# Patient Record
Sex: FEMALE | Race: BLACK OR AFRICAN AMERICAN | NOT HISPANIC OR LATINO | Employment: PART TIME | URBAN - METROPOLITAN AREA
[De-identification: names, ages, dates, MRNs, and addresses within clinical notes are randomized per-mention and may not be internally consistent; named-entity substitution may affect disease eponyms.]

---

## 2017-03-09 ENCOUNTER — OFFICE VISIT (OUTPATIENT)
Dept: FAMILY MEDICINE | Facility: CLINIC | Age: 47
End: 2017-03-09

## 2017-03-09 VITALS
BODY MASS INDEX: 28.96 KG/M2 | OXYGEN SATURATION: 97 % | HEART RATE: 80 BPM | DIASTOLIC BLOOD PRESSURE: 69 MMHG | RESPIRATION RATE: 18 BRPM | SYSTOLIC BLOOD PRESSURE: 107 MMHG | WEIGHT: 169 LBS | TEMPERATURE: 98.5 F

## 2017-03-09 DIAGNOSIS — R07.0 THROAT PAIN: Primary | ICD-10-CM

## 2017-03-09 DIAGNOSIS — J06.9 VIRAL URI: ICD-10-CM

## 2017-03-09 LAB — S PYO AG THROAT QL IA.RAPID: NEGATIVE

## 2017-03-09 RX ORDER — SENNOSIDES 8.6 MG
650 CAPSULE ORAL EVERY 8 HOURS PRN
Qty: 60 TABLET | Refills: 3 | Status: SHIPPED | OUTPATIENT
Start: 2017-03-09 | End: 2020-01-28

## 2017-03-09 NOTE — PROGRESS NOTES
HPI       Satish Tang is a 47 year old  female  who presents with sore throat. Has had a son with strep and is concerned she is mallika it from him,. She has a mild cough. She describes her throat is throbbing.       Patient Active Problem List    Diagnosis Date Noted     Health Care Home 10/30/2012     Priority: Medium     Tier 0  DX V65.8 REPLACED WITH 77191 HEALTH CARE HOME (04/08/2013)       Constipation 10/30/2012     Priority: Medium     Heart burn 10/30/2012     Priority: Medium       Current Outpatient Prescriptions   Medication Sig Dispense Refill     Prenatal Vit-Fe Fumarate-FA (PRENATAL MULTIVITAMIN  PLUS IRON) 27-0.8 MG TABS per tablet Take 1 tablet by mouth daily 100 tablet 3     Cholecalciferol (VITAMIN D) 2000 UNITS tablet Take 2,000 Units by mouth daily 100 tablet 3     miconazole 200 & 2 MG-% (9GM) KIT Place 1 each vaginally At Bedtime 3 each 1     camphor-menthol-methyl sal 4-10-30 % CREA Apply small amount to affected area of back 1 Tube 0     triamcinolone (KENALOG) 0.1 % cream Apply sparingly to affected area three times daily as needed 80 g 0     loratadine (CLARITIN) 10 MG tablet Take 1 tablet (10 mg) by mouth daily 90 tablet 3     Multivitamins CF Formula (MVW COMPLETE FORMULATION) CAPS softgel capsule Take 1 capsule by mouth daily 60 capsule 2     ranitidine (ZANTAC) 150 MG tablet Take 1 tablet (150 mg) by mouth 2 times daily 60 tablet 1     acetaminophen (TYLENOL) 325 MG tablet Take 2 tablets (650 mg) by mouth 3 times daily as needed for pain 100 tablet 0     senna-docusate (NINA-COLACE) 8.6-50 MG per tablet Take 1-2 tablets by mouth 2 times daily as needed for constipation 20 tablet 1     ibuprofen (IBU) 600 MG tablet Take 1 tablet (600 mg) by mouth every 6 hours as needed for pain 60 tablet 0     diclofenac (VOLTAREN) 1 % GEL Apply 4 grams to knees two to three times daily using enclosed dosing card. 100 g 1     ferrous sulfate 325 (65 FE) MG tablet Take 1 tablet (325 mg)  by mouth 2 times daily 30 tablet 2        No Known Allergies          +++++++      Problem, Medication and Allergy Lists were reviewed and updated if needed..    Patient is an established patient of this clinic..         Review of Systems:   General: No distress  HEENT: + sore throat  Pulm: + mild cough  CVS: No chest pain, no palpitations  GI: No abdominal pain, nausea, vomiting or diarrhea   : No dysuria  MSK: No back pain  Skin: No new rashes  Neuro: No headache, no dizziness,               Physical Exam:     Vitals:    03/09/17 1123   BP: 107/69   BP Location: Left arm   Patient Position: Chair   Cuff Size: Adult Regular   Pulse: 80   Resp: 18   Temp: 98.5  F (36.9  C)   TempSrc: Oral   SpO2: 97%   Weight: 169 lb (76.7 kg)     Body mass index is 28.96 kg/(m^2).    Constitutional: Awake, alert, cooperative, no apparent distress, and appears stated age  HEENT: MMM, no conjunctival pallor, clear oropharynx. No uvula deviation.   Pulm: CTAB, No rales, No wheeze, no increased effort of breathing.  CVS: RRR, No murmurs   GI: S NT ND BS +ve  Hem/Onc: No cervical LN kati  Skin : no  rash  Neuro:  No focal neurological deficit  Psych : Good eye contact, well groomed, very interactive and collaborative. Good insight. Thought process is linear and coherent. Associations are tight. Mood is good. Affect euthymic.         No results found for this or any previous visit (from the past 24 hour(s)).    Assessment and Plan      1. Throat pain- Negative rapid strep. likely viral URI. Reassured patient, tylenol for comfort.     - Strep Screen Rapid (Group) ([facility])  - acetaminophen (TYLENOL 8 HOUR) 650 MG CR tablet; Take 1 tablet (650 mg) by mouth every 8 hours as needed for mild pain or fever  Dispense: 60 tablet; Refill: 3  - Strep Culture ([facility])      There are no discontinued medications.    Options for treatment and follow-up care were reviewed with the patient. Satish Tang  engaged in the decision making process and  verbalized understanding of the options discussed and agreed with the final plan.    Katherine Choudhury MD G3  [facility]  Family Medicine Resident  Pager [phone]

## 2017-03-09 NOTE — MR AVS SNAPSHOT
After Visit Summary   3/9/2017    Satish Tang    MRN: [id number]           Patient Information     Date Of Birth          1970        Visit Information        Provider Department      3/9/2017 11:20 AM Katherine Choudhury MD [facility]        Today's Diagnoses     Throat pain    -  1    Viral URI           Follow-ups after your visit        Who to contact     Please call your clinic at [phone] to:    Ask questions about your health    Make or cancel appointments    Discuss your medicines    Learn about your test results    Speak to your doctor   If you have compliments or concerns about an experience at your clinic, or if you wish to file a complaint, please contact [facility] Patient Relations at [phone] or email us at [email or web address]         Additional Information About Your Visit        MyChart Information     EMBI is an electronic gateway that provides easy, online access to your medical records. With EMBI, you can request a clinic appointment, read your test results, renew a prescription or communicate with your care team.     To sign up for WoowUpt visit the website at [email or web address]   You will be asked to enter the access code listed below, as well as some personal information. Please follow the directions to create your username and password.     Your access code is: [email or web address]  Expires: 2017 12:00 PM     Your access code will  in 90 days. If you need help or a new code, please contact your [facility] or call [phone] for assistance.        Care EveryWhere ID     This is your Care EveryWhere ID. This could be used by other organizations to access your [place] medical records  [id number]        Your Vitals Were     Pulse Temperature Respirations Pulse Oximetry Breastfeeding? BMI (Body Mass Index)    80 98.5  F (36.9  C) (Oral) 18 97% No 28.96 kg/m2        Blood Pressure from Last 3 Encounters:   03/15/17 107/70   03/09/17 107/69   12/21/16 110/71    Weight from Last 3 Encounters:   03/15/17 167 lb 12.8 oz (76.1 kg)   03/09/17 169 lb (76.7 kg)   12/21/16 171 lb (77.6 kg)              We Performed the Following     Strep Culture ([facility])     Strep Screen Rapid (Group) ([facility])          Today's Medication Changes          These changes are accurate as of: 3/9/17 11:59 PM.  If you have any questions, ask your nurse or doctor.               These medicines have changed or have updated prescriptions.        Dose/Directions    * acetaminophen 325 MG tablet   Commonly known as:  TYLENOL   This may have changed:  Another medication with the same name was added. Make sure you understand how and when to take each.   Used for:  Headache(784.0)   Changed by:  Moses Cash MD        Dose:  650 mg   Take 2 tablets (650 mg) by mouth 3 times daily as needed for pain   Quantity:  100 tablet   Refills:  0       * acetaminophen 650 MG CR tablet   Commonly known as:  TYLENOL 8 HOUR   This may have changed:  You were already taking a medication with the same name, and this prescription was added. Make sure you understand how and when to take each.   Used for:  Throat pain   Changed by:  Katherine Choudhury MD        Dose:  650 mg   Take 1 tablet (650 mg) by mouth every 8 hours as needed for mild pain or fever   Quantity:  60 tablet   Refills:  3       * Notice:  This list has 2 medication(s) that are the same as other medications prescribed for you. Read the directions carefully, and ask your doctor or other care provider to review them with you.         Where to get your medicines      These medications were sent to [facility] [place], [place] - [address] [facility] [address]     Phone:  [phone]     acetaminophen 650 MG CR tablet                Primary Care Provider Office Phone # Fax #    Sallie Gibson -[phone]        [facility] [address] [address]        Thank you!     Thank you for choosing [facility]  for your care. Our goal is always to provide you with excellent care. Hearing back from our patients is one way we can continue to improve our services. Please take a few minutes to complete the written survey that you may receive in the mail after your visit with us. Thank you!             Your Updated Medication List - Protect others around you: Learn how to safely use, store and throw away your medicines at www.disposemymeds.org.          This list is accurate as of: 3/9/17 11:59 PM.  Always use your most recent med list.                   Brand Name Dispense Instructions for use    * acetaminophen 325 MG tablet    TYLENOL    100 tablet    Take 2 tablets (650 mg) by mouth 3 times daily as needed for pain       * acetaminophen 650 MG CR tablet    TYLENOL 8 HOUR    60 tablet    Take 1 tablet (650 mg) by mouth every 8 hours as needed for mild pain or fever       camphor-menthol-methyl sal 4-10-30 % Crea     1 Tube    Apply small amount to affected area of back       diclofenac 1 % Gel topical gel    VOLTAREN    100 g    Apply 4 grams to knees two to three times daily using enclosed dosing card.       ferrous sulfate 325 (65 FE) MG tablet    IRON    30 tablet    Take 1 tablet (325 mg) by mouth 2 times daily       ibuprofen 600 MG tablet    IBU    60 tablet    Take 1 tablet (600 mg) by mouth every 6 hours as needed for pain       loratadine 10 MG tablet    CLARITIN    90 tablet    Take 1 tablet (10 mg) by mouth daily       miconazole 200 & 2 MG-% (9GM) Kit     3 each    Place 1 each vaginally At Bedtime       multivitamins CF formula Caps capsule     60 capsule    Take 1 capsule by mouth daily       prenatal multivitamin  plus iron 27-0.8 MG Tabs per tablet     100 tablet    Take 1 tablet by mouth daily       ranitidine 150 MG tablet    ZANTAC    60 tablet    Take 1 tablet  (150 mg) by mouth 2 times daily       triamcinolone 0.1 % cream    KENALOG    80 g    Apply sparingly to affected area three times daily as needed       vitamin D 2000 UNITS tablet     100 tablet    Take 2,000 Units by mouth daily       * Notice:  This list has 2 medication(s) that are the same as other medications prescribed for you. Read the directions carefully, and ask your doctor or other care provider to review them with you.

## 2017-03-11 LAB
BACTERIA SPEC CULT: NORMAL
MICRO REPORT STATUS: NORMAL
SPECIMEN SOURCE: NORMAL

## 2017-03-15 ENCOUNTER — OFFICE VISIT (OUTPATIENT)
Dept: FAMILY MEDICINE | Facility: CLINIC | Age: 47
End: 2017-03-15

## 2017-03-15 VITALS
RESPIRATION RATE: 16 BRPM | BODY MASS INDEX: 28.75 KG/M2 | SYSTOLIC BLOOD PRESSURE: 107 MMHG | OXYGEN SATURATION: 99 % | WEIGHT: 167.8 LBS | HEART RATE: 59 BPM | TEMPERATURE: 97.7 F | DIASTOLIC BLOOD PRESSURE: 70 MMHG

## 2017-03-15 DIAGNOSIS — N92.6 MISSED PERIODS: Primary | ICD-10-CM

## 2017-03-15 DIAGNOSIS — K59.09 CHRONIC CONSTIPATION: ICD-10-CM

## 2017-03-15 LAB
HCG UR QL: NEGATIVE
TSH SERPL DL<=0.005 MIU/L-ACNC: 0.93 MU/L (ref 0.4–4)

## 2017-03-15 RX ORDER — AMOXICILLIN 250 MG
1-2 CAPSULE ORAL 2 TIMES DAILY PRN
Qty: 90 TABLET | Refills: 3 | Status: SHIPPED | OUTPATIENT
Start: 2017-03-15 | End: 2020-02-19

## 2017-03-15 ASSESSMENT — ENCOUNTER SYMPTOMS
NEUROLOGICAL NEGATIVE: 1
CARDIOVASCULAR NEGATIVE: 1
CONSTITUTIONAL NEGATIVE: 1
PSYCHIATRIC NEGATIVE: 1
RESPIRATORY NEGATIVE: 1
ENDOCRINE NEGATIVE: 1
HEMATOLOGIC/LYMPHATIC NEGATIVE: 1

## 2017-03-15 NOTE — LETTER
March 17, 2017      Satish Dale Tang  [address] [facility] [address]        Dear Satish,    Thank you for getting your care at [facility]. Please see below for your test results. All are normal. I have sent a prescription for birth control pills that should help make your period more regular. Please come see me for a recheck after taking the pills for one month.     Resulted Orders   HCG Qualitative Urine (UPT) ([facility])   Result Value Ref Range    HCG Qual Urine NEGATIVE Negative   TSH with free T4 reflex   Result Value Ref Range    TSH 0.93 0.40 - 4.00 mU/L       If you have any concerns about these results please call and leave a message for me or send a Databanqt message to the clinic.    Sincerely,    Sallie Gibson MD

## 2017-03-15 NOTE — PROGRESS NOTES
HPI:       Satish Tang is a 47 year old who presents for the following  Patient presents with:  Constipation    A Albanian  was used for  this visit.      Constipation     Onset: always has had hard stools    Description:   Frequency of bowel movements: 4-5 days.  Stool consistency: hard, small caliber    Progression of Symptoms:  worsening    Accompanying Signs & Symptoms:  Abdominal pain (cramping?): YES  Blood in stool: no   Rectal pain: no   Nausea/vomiting: no   Weight loss or gain: no    History:   History of abdominal surgery: no     Precipitating factors:   Recent use of narcotics, anticholinergics, calcium channel blockers, antacids, or iron supplements: no   Chronic Laxative Use: has used stool softener and laxative intermittently in past. Not using for some time.         Therapies Tried and outcome: change in diet, increase in fluids and stool softeners    Problem, Medication and Allergy Lists were   reviewed and are current.     Patient Active Problem List    Diagnosis Date Noted     Missed periods 03/15/2017     Priority: Medium     Health Care Home 10/30/2012     Priority: Medium     Tier 0  DX V65.8 REPLACED WITH 20650 HEALTH CARE HOME (04/08/2013)       Constipation 10/30/2012     Priority: Medium     Heart burn 10/30/2012     Priority: Medium     Patient is an established patient of this clinic.         Review of Systems:   Review of Systems   Constitutional: Negative.    Respiratory: Negative.    Cardiovascular: Negative.    Endocrine: Negative.    Neurological: Negative.    Hematological: Negative.    Psychiatric/Behavioral: Negative.              Physical Exam:   Patient Vitals for the past 24 hrs:   BP Temp Temp src Pulse Resp SpO2 Weight   03/15/17 1110 107/70 97.7  F (36.5  C) Oral 59 16 99 % 167 lb 12.8 oz (76.1 kg)     Body mass index is 28.75 kg/(m^2).  Vitals were reviewed      Physical Exam   Constitutional: She appears well-developed and well-nourished. No distress.    Neck: Normal range of motion. No thyromegaly present.   Cardiovascular: Regular rhythm.  Exam reveals no gallop and no friction rub.    No murmur heard.  Bradycardic   Abdominal: Soft. Bowel sounds are normal. She exhibits no distension and no mass. There is no tenderness. There is no rebound and no guarding.   Psychiatric: She has a normal mood and affect.         Results:     Pending  Assessment and Plan     1. Chronic constipation, recurrent  - senna-docusate (NINA-COLACE) 8.6-50 MG per tablet; Take 1-2 tablets by mouth 2 times daily as needed for constipation  Dispense: 90 tablet; Refill: 3  - TSH with free T4 reflex    2. Missed periods  - HCG Qualitative Urine (UPT) ([facility])  - TSH with free T4 reflex    3. Bradycardia, asymptomatic  -  TSH    Will check UPT and TSH and call with results. If not pregnant, would like to start OCP to try to regulate periods. Nonsmoker, normotensive, no h/o clots.     There are no discontinued medications.  Options for treatment and follow-up care were reviewed with the patient. Satish Tang  engaged in the decision making process and verbalized understanding of the options discussed and agreed with the final plan.    Sallie Gibson MD

## 2017-03-15 NOTE — MR AVS SNAPSHOT
After Visit Summary   3/15/2017    Satish Tang    MRN: [id number]           Patient Information     Date Of Birth          1970        Visit Information        Provider Department      3/15/2017 11:00 AM Sallie Gibson MD [facility]        Today's Diagnoses     Missed periods    -  1    Chronic constipation           Follow-ups after your visit        Follow-up notes from your care team     Return if symptoms worsen or fail to improve.      Who to contact     Please call your clinic at [phone] to:    Ask questions about your health    Make or cancel appointments    Discuss your medicines    Learn about your test results    Speak to your doctor   If you have compliments or concerns about an experience at your clinic, or if you wish to file a complaint, please contact [facility] Patient Relations at [phone] or email us at [email or web address]         Additional Information About Your Visit        MyChart Information     Voltairet is an electronic gateway that provides easy, online access to your medical records. With [employer], you can request a clinic appointment, read your test results, renew a prescription or communicate with your care team.     To sign up for Voltairet visit the website at [email or web address]   You will be asked to enter the access code listed below, as well as some personal information. Please follow the directions to create your username and password.     Your access code is: [email or web address]  Expires: 2017 12:00 PM     Your access code will  in 90 days. If you need help or a new code, please contact your [facility] or call [phone] for assistance.        Care EveryWhere ID     This is your Care EveryWhere ID. This could be used by other organizations to access your [place] medical records  [id number]        Your Vitals Were     Pulse Temperature Respirations  Pulse Oximetry BMI (Body Mass Index)       59 97.7  F (36.5  C) (Oral) 16 99% 28.75 kg/m2        Blood Pressure from Last 3 Encounters:   03/15/17 107/70   03/09/17 107/69   12/21/16 110/71    Weight from Last 3 Encounters:   03/15/17 167 lb 12.8 oz (76.1 kg)   03/09/17 169 lb (76.7 kg)   12/21/16 171 lb (77.6 kg)              We Performed the Following     HCG Qualitative Urine (UPT) ([facility])     TSH with free T4 reflex          Today's Medication Changes          These changes are accurate as of: 3/15/17 12:00 PM.  If you have any questions, ask your nurse or doctor.               These medicines have changed or have updated prescriptions.        Dose/Directions    acetaminophen 650 MG CR tablet   Commonly known as:  TYLENOL 8 HOUR   This may have changed:  Another medication with the same name was removed. Continue taking this medication, and follow the directions you see here.   Used for:  Throat pain   Changed by:  Katherine Choudhury MD        Dose:  650 mg   Take 1 tablet (650 mg) by mouth every 8 hours as needed for mild pain or fever   Quantity:  60 tablet   Refills:  3         Stop taking these medicines if you haven't already. Please contact your care team if you have questions.     miconazole 200 & 2 MG-% (9GM) Kit   Stopped by:  Sallie Gibson MD           prenatal multivitamin  plus iron 27-0.8 MG Tabs per tablet   Stopped by:  Sallie Gibson MD                Where to get your medicines      These medications were sent to [facility] [place], [place] - [address] [facility] [address]     Phone:  [phone]     senna-docusate 8.6-50 MG per tablet                Primary Care Provider Office Phone # Fax #    Sallie Gibson [phone]       [facility] [address] [address]        Thank you!     Thank you for choosing [facility]  for your care. Our goal is always to provide you with excellent  care. Hearing back from our patients is one way we can continue to improve our services. Please take a few minutes to complete the written survey that you may receive in the mail after your visit with us. Thank you!             Your Updated Medication List - Protect others around you: Learn how to safely use, store and throw away your medicines at www.disposemymeds.org.          This list is accurate as of: 3/15/17 12:00 PM.  Always use your most recent med list.                   Brand Name Dispense Instructions for use    acetaminophen 650 MG CR tablet    TYLENOL 8 HOUR    60 tablet    Take 1 tablet (650 mg) by mouth every 8 hours as needed for mild pain or fever       camphor-menthol-methyl sal 4-10-30 % Crea     1 Tube    Apply small amount to affected area of back       diclofenac 1 % Gel topical gel    VOLTAREN    100 g    Apply 4 grams to knees two to three times daily using enclosed dosing card.       ferrous sulfate 325 (65 FE) MG tablet    IRON    30 tablet    Take 1 tablet (325 mg) by mouth 2 times daily       ibuprofen 600 MG tablet    IBU    60 tablet    Take 1 tablet (600 mg) by mouth every 6 hours as needed for pain       loratadine 10 MG tablet    CLARITIN    90 tablet    Take 1 tablet (10 mg) by mouth daily       multivitamins CF formula Caps capsule     60 capsule    Take 1 capsule by mouth daily       ranitidine 150 MG tablet    ZANTAC    60 tablet    Take 1 tablet (150 mg) by mouth 2 times daily       senna-docusate 8.6-50 MG per tablet    NINA-COLACE    90 tablet    Take 1-2 tablets by mouth 2 times daily as needed for constipation       triamcinolone 0.1 % cream    KENALOG    80 g    Apply sparingly to affected area three times daily as needed       vitamin D 2000 UNITS tablet     100 tablet    Take 2,000 Units by mouth daily

## 2017-03-17 DIAGNOSIS — N92.6 IRREGULAR MENSTRUAL CYCLE: Primary | ICD-10-CM

## 2017-03-17 RX ORDER — LEVONORGESTREL AND ETHINYL ESTRADIOL 0.15-0.03
1 KIT ORAL DAILY
Qty: 28 TABLET | Refills: 11 | Status: SHIPPED | OUTPATIENT
Start: 2017-03-17 | End: 2021-05-04

## 2017-03-23 NOTE — PROGRESS NOTES
Preceptor Attestation:   Patient seen and discussed with the resident. Assessment and plan reviewed with resident and agreed upon.   Supervising Physician:  Nohemi Molina MD

## 2017-04-03 ENCOUNTER — HOSPITAL ENCOUNTER (EMERGENCY)
Facility: CLINIC | Age: 47
Discharge: HOME | End: 2017-04-03
Attending: EMERGENCY MEDICINE | Admitting: EMERGENCY MEDICINE
Payer: COMMERCIAL

## 2017-04-03 VITALS
SYSTOLIC BLOOD PRESSURE: 106 MMHG | RESPIRATION RATE: 16 BRPM | OXYGEN SATURATION: 99 % | HEART RATE: 66 BPM | DIASTOLIC BLOOD PRESSURE: 68 MMHG | TEMPERATURE: 98.4 F

## 2017-04-03 DIAGNOSIS — J06.9 ACUTE RESPIRATORY DISEASE: ICD-10-CM

## 2017-04-03 DIAGNOSIS — Z20.828 EXPOSURE TO INFLUENZA: ICD-10-CM

## 2017-04-03 DIAGNOSIS — J06.9 VIRAL UPPER RESPIRATORY INFECTION: ICD-10-CM

## 2017-04-03 LAB
DEPRECATED S PYO AG THROAT QL EIA: NORMAL
MICRO REPORT STATUS: NORMAL
SPECIMEN SOURCE: NORMAL

## 2017-04-03 PROCEDURE — 99284 EMERGENCY DEPT VISIT MOD MDM: CPT | Mod: Z6 | Performed by: EMERGENCY MEDICINE

## 2017-04-03 PROCEDURE — 25000125 ZZHC RX 250: Performed by: EMERGENCY MEDICINE

## 2017-04-03 PROCEDURE — 87880 STREP A ASSAY W/OPTIC: CPT | Performed by: EMERGENCY MEDICINE

## 2017-04-03 PROCEDURE — 87081 CULTURE SCREEN ONLY: CPT | Performed by: EMERGENCY MEDICINE

## 2017-04-03 PROCEDURE — 25000132 ZZH RX MED GY IP 250 OP 250 PS 637: Performed by: EMERGENCY MEDICINE

## 2017-04-03 PROCEDURE — 99283 EMERGENCY DEPT VISIT LOW MDM: CPT | Performed by: EMERGENCY MEDICINE

## 2017-04-03 RX ORDER — ONDANSETRON 4 MG/1
4 TABLET, ORALLY DISINTEGRATING ORAL EVERY 6 HOURS PRN
Qty: 10 TABLET | Refills: 0 | Status: SHIPPED | OUTPATIENT
Start: 2017-04-03 | End: 2017-04-06

## 2017-04-03 RX ORDER — BENZONATATE 100 MG/1
100 CAPSULE ORAL 3 TIMES DAILY PRN
Qty: 15 CAPSULE | Refills: 0 | Status: SHIPPED | OUTPATIENT
Start: 2017-04-03 | End: 2020-01-28

## 2017-04-03 RX ORDER — ACETAMINOPHEN 325 MG/1
975 TABLET ORAL ONCE
Status: COMPLETED | OUTPATIENT
Start: 2017-04-03 | End: 2017-04-03

## 2017-04-03 RX ORDER — ONDANSETRON 4 MG/1
4 TABLET, ORALLY DISINTEGRATING ORAL ONCE
Status: COMPLETED | OUTPATIENT
Start: 2017-04-03 | End: 2017-04-03

## 2017-04-03 RX ADMIN — ONDANSETRON 4 MG: 4 TABLET, ORALLY DISINTEGRATING ORAL at 11:58

## 2017-04-03 RX ADMIN — ACETAMINOPHEN 975 MG: 325 TABLET, FILM COATED ORAL at 11:58

## 2017-04-03 ASSESSMENT — ENCOUNTER SYMPTOMS
HEADACHES: 1
DIFFICULTY URINATING: 0
NAUSEA: 1
ABDOMINAL PAIN: 0
FEVER: 1
COLOR CHANGE: 0
ARTHRALGIAS: 0
BACK PAIN: 1
EYE REDNESS: 0
NECK STIFFNESS: 0
COUGH: 1
SHORTNESS OF BREATH: 0
CONFUSION: 0

## 2017-04-03 NOTE — DISCHARGE INSTRUCTIONS
Please make an appointment to follow up with Your Primary Care Provider in 7 days if not improving.    Use Zofran as needed for nausea.    Tessalon pearls, swallow whole, every 6 hours as needed for cough.    Take Tylenol for body aches and drink plenty of fluids.             *VIRAL RESPIRATORY ILLNESS   You have an Upper Respiratory Illness (URI) caused by a virus. This illness is contagious during the first few days. It is spread through the air by coughing and sneezing or by direct contact (touching the sick person and then touching your own eyes, nose or mouth). When the infection causes a lot of irritation, the air passages can go into spasm. This causes wheezing and shortness of breath.    Most viral illnesses resolve within 7-10 days with rest and simple home remedies, although the illness may sometimes last for several weeks. Antibiotics will not kill a virus and are generally not prescribed for this condition.  HOME CARE:  1) If symptoms are severe, rest at home for the first 2-3 days. When resuming activity, don't let yourself become overly tired.  2) Avoid exposure to cigarette smoke (yours or others).  3) You may use acetaminophen (Tylenol) 650-1000 mg every 6 hours or ibuprofen (Motrin, Advil) 600 mg every 6-8 hours with food to control pain, if you are able to take these medicines. [ NOTE : If you have chronic liver or kidney disease or ever had a stomach ulcer or GI bleeding, talk with your doctor before using these medicines.] (Aspirin should never be used in anyone under 18 years of age who is ill with a fever. It may cause severe liver damage.)  4) Your appetite may be poor so a light diet is fine. Avoid dehydration by drinking 6-8 glasses of fluids per day (water, soft, drinks, juices, tea, soup, etc.). Extra fluids will help loosen secretions in the nose and lungs.  5) Over-the-counter cold medicines will not shorten the duration of the illness, but may be helpful for the following symptoms:  cough (Robitussin DM);   FOLLOW UP with your doctor or as advised if you are not improving over the next week.  GET PROMPT MEDICAL ATTENTION if any of the following occur:  -- Cough with lots of colored sputum (mucus) or blood in your sputum  -- Chest pain, shortness of breath, wheezing or difficulty breathing  -- Severe headache; face, neck or ear pain  -- Fever over 101 F (38.3 C) for more than three days  -- Unable to swallow due to throat pain    [id number] [employer], [address] [place] [address], [place] [address]. All rights reserved. This information is not intended as a substitute for professional medical care. Always follow your healthcare professional's instructions.

## 2017-04-03 NOTE — ED PROVIDER NOTES
History     Chief Complaint   Patient presents with     Back Pain     HPI  Satish Tang is a 47 year old otherwise healthy female who presents for evaluation of cough and back pain. Patient complains of the onset of subjective fevers, cough, and headache that began on Friday, three days ago. She also complains of difficulty swallowing, nausea, and lower back pain that began today. She denies back pain with movement. Patient has been around sick contacts who have bee recently diagnosed with Strep throat and influenza. No history of abdominal surgery. No rash or skin changes. Patient took two tablets of Excedrin around 1 AM this morning with no relief.     I have reviewed the Medications, Allergies, Past Medical and Surgical History, and Social History in the [facility] system.  History reviewed. No pertinent past medical history.    History reviewed. No pertinent surgical history.    No family history on file.    Social History   Substance Use Topics     Smoking status: Never Smoker     Smokeless tobacco: Never Used     Alcohol use No     No current facility-administered medications for this encounter.      Current Outpatient Prescriptions   Medication     benzonatate (TESSALON PERLES) 100 MG capsule     ondansetron (ZOFRAN ODT) 4 MG ODT tab     omeprazole (PRILOSEC) 20 MG CR capsule     levonorgestrel-ethinyl estradiol (NORDETTE) 0.15-30 MG-MCG per tablet     senna-docusate (NINA-COLACE) 8.6-50 MG per tablet     acetaminophen (TYLENOL 8 HOUR) 650 MG CR tablet     Cholecalciferol (VITAMIN D) 2000 UNITS tablet     camphor-menthol-methyl sal 4-10-30 % CREA     triamcinolone (KENALOG) 0.1 % cream     loratadine (CLARITIN) 10 MG tablet     Multivitamins CF Formula (MVW COMPLETE FORMULATION) CAPS softgel capsule     ranitidine (ZANTAC) 150 MG tablet     ibuprofen (IBU) 600 MG tablet     diclofenac (VOLTAREN) 1 % GEL     ferrous sulfate 325 (65 FE) MG tablet      No Known Allergies    Review of Systems   Constitutional:  Positive for fever (subjective).   HENT: Negative for congestion.    Eyes: Negative for redness.   Respiratory: Positive for cough. Negative for shortness of breath.    Cardiovascular: Negative for chest pain.   Gastrointestinal: Positive for nausea. Negative for abdominal pain.   Genitourinary: Negative for difficulty urinating.   Musculoskeletal: Positive for back pain (lower). Negative for arthralgias and neck stiffness.   Skin: Negative for color change.   Neurological: Positive for headaches.   Psychiatric/Behavioral: Negative for confusion.   All other systems reviewed and are negative.      Physical Exam      Physical Exam   Constitutional: No distress.   HENT:   Head: Atraumatic.   Mouth/Throat: Oropharynx is clear and moist. No oropharyngeal exudate.   Eyes: Pupils are equal, round, and reactive to light. No scleral icterus.   Cardiovascular: Normal heart sounds and intact distal pulses.    Pulmonary/Chest: Breath sounds normal. No respiratory distress.   Abdominal: Soft. Bowel sounds are normal. There is no tenderness.   Musculoskeletal: She exhibits no edema or tenderness.   Skin: Skin is warm. No rash noted. She is not diaphoretic.   Nursing note and vitals reviewed.      ED Course     ED Course     Procedures       11:20 AM  The patient was seen and examined by Dr. Swan in Room 19.          Critical Care time:  none               Labs Ordered and Resulted from Time of ED Arrival Up to the Time of Departure from the ED   RAPID STREP SCREEN       Assessments & Plan (with Medical Decision Making)   The patient has upper respiratory infection symptoms with no shortness of breath and no hypoxia.  Her lung exam is normal.  She has a child with strep however with her cough this is less likely.  Her rapid strep is negative.  Her  has been diagnosed with influenza and she likely has influenza as well.  She is 3 days into her illness and would not benefit from Tamiflu.  She was given Tessalon to use  as needed for cough, Zofran for nausea and Prilosec for her gastritis type symptoms.  She will follow-up with her primary doctor or return here if feeling worse.    I have reviewed the nursing notes.    I have reviewed the findings, diagnosis, plan and need for follow up with the patient.    Discharge Medication List as of 4/3/2017  1:19 PM      START taking these medications    Details   benzonatate (TESSALON PERLES) 100 MG capsule Take 1 capsule (100 mg) by mouth 3 times daily as needed for cough, Disp-15 capsule, R-0, Local Print      ondansetron (ZOFRAN ODT) 4 MG ODT tab Take 1 tablet (4 mg) by mouth every 6 hours as needed for nausea, Disp-10 tablet, R-0, Local Print      omeprazole (PRILOSEC) 20 MG CR capsule Take 1 capsule (20 mg) by mouth daily, Disp-30 capsule, R-0, Local Print             Final diagnoses:   Viral upper respiratory infection   I, Olena Lanier, am serving as a trained medical scribe to document services personally performed by Marcio Swan MD, based on the provider's statements to me.   I, Marcio Swan MD, was physically present and have reviewed and verified the accuracy of this note documented by Olena Lanier.      4/3/2017   [facility], [place], EMERGENCY DEPARTMENT     Brandon Swan MD  04/03/17 2013

## 2017-04-03 NOTE — ED AVS SNAPSHOT
[facility], [place], Emergency Department    [address] [address]    Phone:  [phone]                                       Satish Tang   MRN: [id number]    Department:  [facility], Emergency Department   Date of Visit:  4/3/2017           After Visit Summary Signature Page     I have received my discharge instructions, and my questions have been answered. I have discussed any challenges I see with this plan with the nurse or doctor.    ..........................................................................................................................................  Patient/Patient Representative Signature      ..........................................................................................................................................  Patient Representative Print Name and Relationship to Patient    ..................................................               ................................................  Date                                            Time    ..........................................................................................................................................  Reviewed by Signature/Title    ...................................................              ..............................................  Date                                                            Time

## 2017-04-03 NOTE — ED NOTES
Arrived to ED d/t c/o lower back pain and nausea, also has had a cough, has been in contact with influenza and strep throat, symptoms have been ongoing since last Thursday, VSS upon arrival, no significant past med hx

## 2017-04-05 LAB
BACTERIA SPEC CULT: NORMAL
Lab: NORMAL
MICRO REPORT STATUS: NORMAL
SPECIMEN SOURCE: NORMAL

## 2017-09-10 ENCOUNTER — HEALTH MAINTENANCE LETTER (OUTPATIENT)
Age: 47
End: 2017-09-10

## 2019-12-23 NOTE — ED AVS SNAPSHOT
[facility], Emergency Department    [address] [place] [address]    Phone:  [phone]                                       Satish Tang   MRN: [id number]    Department:  [facility], Emergency Department   Date of Visit:  4/3/2017           Patient Information     Date Of Birth          1970        Your diagnoses for this visit were:     Viral upper respiratory infection        You were seen by Brandon Swan MD.        Discharge Instructions       Please make an appointment to follow up with Your Primary Care Provider in 7 days if not improving.    Use Zofran as needed for nausea.    Tessalon pearls, swallow whole, every 6 hours as needed for cough.    Take Tylenol for body aches and drink plenty of fluids.             *VIRAL RESPIRATORY ILLNESS   You have an Upper Respiratory Illness (URI) caused by a virus. This illness is contagious during the first few days. It is spread through the air by coughing and sneezing or by direct contact (touching the sick person and then touching your own eyes, nose or mouth). When the infection causes a lot of irritation, the air passages can go into spasm. This causes wheezing and shortness of breath.    Most viral illnesses resolve within 7-10 days with rest and simple home remedies, although the illness may sometimes last for several weeks. Antibiotics will not kill a virus and are generally not prescribed for this condition.  HOME CARE:  1) If symptoms are severe, rest at home for the first 2-3 days. When resuming activity, don't let yourself become overly tired.  2) Avoid exposure to cigarette smoke (yours or others).  3) You may use acetaminophen (Tylenol) 650-1000 mg every 6 hours or ibuprofen (Motrin, Advil) 600 mg every 6-8 hours with food to control pain, if you are able to take these medicines. [ NOTE : If you have chronic liver or kidney disease or ever had a stomach ulcer or GI bleeding, talk with your doctor before using these  Spoke with patient and informed her that it shows her xanax was sent in on 12/20/2019 by Droothy Wallace to [facility]. Patient verbalized understanding and will contact her pharmacy.     Pharmacy states that they need a provider to say ok to filling early. Please advise.    medicines.] (Aspirin should never be used in anyone under 18 years of age who is ill with a fever. It may cause severe liver damage.)  4) Your appetite may be poor so a light diet is fine. Avoid dehydration by drinking 6-8 glasses of fluids per day (water, soft, drinks, juices, tea, soup, etc.). Extra fluids will help loosen secretions in the nose and lungs.  5) Over-the-counter cold medicines will not shorten the duration of the illness, but may be helpful for the following symptoms: cough (Robitussin DM);   FOLLOW UP with your doctor or as advised if you are not improving over the next week.  GET PROMPT MEDICAL ATTENTION if any of the following occur:  -- Cough with lots of colored sputum (mucus) or blood in your sputum  -- Chest pain, shortness of breath, wheezing or difficulty breathing  -- Severe headache; face, neck or ear pain  -- Fever over 101 F (38.3 C) for more than three days  -- Unable to swallow due to throat pain    0590-1422 [employer], [address] [place] [address]. All rights reserved. This information is not intended as a substitute for professional medical care. Always follow your healthcare professional's instructions.        24 Hour Appointment Hotline       To make an appointment at any [facility] clinic, call [phone] ([phone]). If you don't have a family doctor or clinic, we will help you find one. [facility] clinics are conveniently located to serve the needs of you and your family.             Review of your medicines      START taking        Dose / Directions Last dose taken    benzonatate 100 MG capsule   Commonly known as:  TESSALON PERLES   Dose:  100 mg   Quantity:  15 capsule        Take 1 capsule (100 mg) by mouth 3 times daily as needed for cough   Refills:  0        omeprazole 20 MG CR capsule   Commonly known as:  priLOSEC   Dose:  20 mg   Quantity:  30 capsule        Take 1 capsule (20 mg) by mouth daily   Refills:  0        ondansetron 4 MG ODT  tab   Commonly known as:  ZOFRAN ODT   Dose:  4 mg   Quantity:  10 tablet        Take 1 tablet (4 mg) by mouth every 6 hours as needed for nausea   Refills:  0          Our records show that you are taking the medicines listed below. If these are incorrect, please call your family doctor or clinic.        Dose / Directions Last dose taken    acetaminophen 650 MG CR tablet   Commonly known as:  TYLENOL 8 HOUR   Dose:  650 mg   Quantity:  60 tablet        Take 1 tablet (650 mg) by mouth every 8 hours as needed for mild pain or fever   Refills:  3        camphor-menthol-methyl sal 4-10-30 % Crea   Quantity:  1 Tube        Apply small amount to affected area of back   Refills:  0        diclofenac 1 % Gel topical gel   Commonly known as:  VOLTAREN   Quantity:  100 g        Apply 4 grams to knees two to three times daily using enclosed dosing card.   Refills:  1        ferrous sulfate 325 (65 FE) MG tablet   Commonly known as:  IRON   Dose:  1 tablet   Quantity:  30 tablet        Take 1 tablet (325 mg) by mouth 2 times daily   Refills:  2        ibuprofen 600 MG tablet   Commonly known as:  IBU   Dose:  600 mg   Quantity:  60 tablet        Take 1 tablet (600 mg) by mouth every 6 hours as needed for pain   Refills:  0        levonorgestrel-ethinyl estradiol 0.15-30 MG-MCG per tablet   Commonly known as:  NORDETTE   Dose:  1 tablet   Quantity:  28 tablet        Take 1 tablet by mouth daily   Refills:  11        loratadine 10 MG tablet   Commonly known as:  CLARITIN   Dose:  10 mg   Quantity:  90 tablet        Take 1 tablet (10 mg) by mouth daily   Refills:  3        multivitamins CF formula Caps capsule   Dose:  1 capsule   Quantity:  60 capsule        Take 1 capsule by mouth daily   Refills:  2        ranitidine 150 MG tablet   Commonly known as:  ZANTAC   Dose:  150 mg   Quantity:  60 tablet        Take 1 tablet (150 mg) by mouth 2 times daily   Refills:  1        senna-docusate 8.6-50 MG per tablet   Commonly known as:   "NINA-COLACE   Dose:  1-2 tablet   Quantity:  90 tablet        Take 1-2 tablets by mouth 2 times daily as needed for constipation   Refills:  3        triamcinolone 0.1 % cream   Commonly known as:  KENALOG   Quantity:  80 g        Apply sparingly to affected area three times daily as needed   Refills:  0        vitamin D 2000 UNITS tablet   Dose:  2000 Units   Quantity:  100 tablet        Take 2,000 Units by mouth daily   Refills:  3                Prescriptions were sent or printed at these locations (3 Prescriptions)                   Other Prescriptions                Printed at Department/Unit printer (3 of 3)         benzonatate (TESSALON PERLES) 100 MG capsule               ondansetron (ZOFRAN ODT) 4 MG ODT tab               omeprazole (PRILOSEC) 20 MG CR capsule                Procedures and tests performed during your visit     Beta strep group A culture    Rapid strep screen      Orders Needing Specimen Collection     None      Pending Results     Date and Time Order Name Status Description    4/3/2017 1200 Beta strep group A culture In process             Pending Culture Results     Date and Time Order Name Status Description    4/3/2017 1200 Beta strep group A culture In process             Thank you for choosing [facility]       Thank you for choosing [facility] for your care. Our goal is always to provide you with excellent care. Hearing back from our patients is one way we can continue to improve our services. Please take a few minutes to complete the written survey that you may receive in the mail after you visit with us. Thank you!        [employer] Information     [employer] lets you send messages to your doctor, view your test results, renew your prescriptions, schedule appointments and more. To sign up, go to [email or web address] . Click on \"Log in\" on the left side of the screen, which will take you to the Welcome page. Then click on \"Sign up Now\" on the right side of the page.     You will be asked to " enter the access code listed below, as well as some personal information. Please follow the directions to create your username and password.     Your access code is: [email or web address]  Expires: 2017 12:00 PM     Your access code will  in 90 days. If you need help or a new code, please call your [facility] clinic or [phone].        Care EveryWhere ID     This is your Care EveryWhere ID. This could be used by other organizations to access your [facility] medical records  [id number]        After Visit Summary       This is your record. Keep this with you and show to your community pharmacist(s) and doctor(s) at your next visit.

## 2020-01-28 ENCOUNTER — OFFICE VISIT (OUTPATIENT)
Dept: FAMILY MEDICINE | Facility: CLINIC | Age: 50
End: 2020-01-28
Payer: COMMERCIAL

## 2020-01-28 VITALS
HEART RATE: 81 BPM | HEIGHT: 64 IN | SYSTOLIC BLOOD PRESSURE: 138 MMHG | BODY MASS INDEX: 28.61 KG/M2 | WEIGHT: 167.6 LBS | TEMPERATURE: 98.3 F | RESPIRATION RATE: 16 BRPM | DIASTOLIC BLOOD PRESSURE: 78 MMHG | OXYGEN SATURATION: 98 %

## 2020-01-28 DIAGNOSIS — Z13.9 SCREENING FOR CONDITION: ICD-10-CM

## 2020-01-28 DIAGNOSIS — M54.50 ACUTE BILATERAL LOW BACK PAIN WITHOUT SCIATICA: ICD-10-CM

## 2020-01-28 DIAGNOSIS — N92.6 IRREGULAR PERIODS: ICD-10-CM

## 2020-01-28 DIAGNOSIS — N93.9 VAGINAL SPOTTING: Primary | ICD-10-CM

## 2020-01-28 LAB
BACTERIA: NORMAL
BACTERIA: NORMAL
BILIRUBIN UR: NEGATIVE MG/DL
BLOOD UR: ABNORMAL MG/DL
CASTS: NORMAL /LPF
CLUE CELLS: NORMAL
CRYSTAL URINE: NORMAL /LPF
EPITHELIAL CELLS UR: NORMAL /LPF (ref 0–2)
GLUCOSE URINE: NEGATIVE
HCT VFR BLD AUTO: 43.3 % (ref 35–47)
HEMOGLOBIN: 13.3 G/DL (ref 11.7–15.7)
KETONES UR QL: NEGATIVE MG/DL
LEUKOCYTE ESTERASE UR: NEGATIVE
MCH RBC QN AUTO: 27.4 PG (ref 26.5–35)
MCHC RBC AUTO-ENTMCNC: 30.7 G/DL (ref 32–36)
MCV RBC AUTO: 89.1 FL (ref 78–100)
MOTILE TRICHOMONAS: NEGATIVE
MUCOUS URINE: NORMAL LPF
NITRITE UR QL STRIP: NEGATIVE MG/DL
ODOR: NORMAL
PH UR STRIP: 6 [PH] (ref 4.5–8)
PH WET PREP: <4.5 (ref 3.8–4.5)
PLATELET # BLD AUTO: 263 K/UL (ref 150–450)
PROTEIN UR: NEGATIVE MG/DL
RBC # BLD AUTO: 4.86 M/UL (ref 3.8–5.2)
RBC URINE: <2 /HPF
SP GR UR STRIP: 1.01 (ref 1–1.03)
TSH SERPL DL<=0.005 MIU/L-ACNC: 1.23 MU/L (ref 0.4–4)
UROBILINOGEN UR STRIP-ACNC: ABNORMAL E.U./DL
WBC # BLD AUTO: 5.5 K/UL (ref 4–11)
WBC URINE: NORMAL /HPF
WBC WET PREP: NORMAL (ref 2–5)
YEAST: NORMAL

## 2020-01-28 ASSESSMENT — MIFFLIN-ST. JEOR: SCORE: 1371.74

## 2020-01-28 NOTE — PROGRESS NOTES
"GER Tang is a 50 year old  who presents for   Chief Complaint   Patient presents with     Abnormal Bleeding Problem     Has been bleeding vaginally for the last month with bilateral back pain.     True age estimated to be 47.     Vaginal Bleeding /Dysmenorrhea  Onset: 1 month ago    Description:   Duration of bleeding episodes: a little bleeding everyday  Frequency between periods:  25 days  Describe bleeding/flow:   Clots: no  Number of pads/hour:    Cramping: none    Accompanying Signs & Symptoms:  Weakness: no  Lightheadedness: no  Hot flashes: no  Nosebleeds/Easy bruising: no  Vaginal Discharge: no    History:  Patient's last menstrual period was 01/15/2020 (approximate).  Previous normal periods: Yes  Contraceptive use: NO  Possibility of Pregnancy: no   Any bleeding after intercourse: Yes Details: always bleeding   Age of first period (menarche): unknown  Abnormal PAP Smears: no  Any previous pelvic imaging? Yes Details: ultrasound after IUD placement      What makes it worse?:  nothing      What makes it better?: nothing    Treatments and Outcome? Nothing      Problem, Medication and Allergy Lists were reviewed and updated if needed.    Patient is an established patient of this clinic.         Review of Systems:   Review of Systems  Gen: no fevers, no chills, no nausea, no vomiting, no weight loss, no hot flashes  GI: no changes in bowel movements  : daily bleeding, no dysuria, no dyspareunia  Neuro: no syncope  Derm: no skin changes   Neuro: no focal weakness, numbness, tingling anywhere       Physical Exam:     Vitals:    01/28/20 1024   BP: 138/78   BP Location: Left arm   Patient Position: Sitting   Cuff Size: Adult Regular   Pulse: 81   Resp: 16   Temp: 98.3  F (36.8  C)   TempSrc: Oral   SpO2: 98%   Weight: 76 kg (167 lb 9.6 oz)   Height: 1.62 m (5' 3.78\")     Body mass index is 28.97 kg/m .  Vitals were reviewed and were normal     PHYSICAL EXAMINATION   Exam:  Constitutional: " healthy, alert and no distress  Neck: Neck supple. No adenopathy. Thyroid symmetric, normal size,  Gastrointestinal: Abdomen soft, non-tender. BS normal. No masses, organomegaly  : Gr 2 circ, well rugated vagina without atrophy, anteverted uterus, no CMT, masses. Multiparous cervix without lesions. Scant discharge without odor. No bleeding.      Results:   Results are ordered and pending    Assessment and Plan        Satish was seen today for abnormal bleeding problem.    Diagnoses and all orders for this visit:    Vaginal spotting  Irregular menses  ddx includes: perimenopause, infection (vaginal, bladder), thyroid disorder, other  -     CBC with Plt ([facility])  -     TSH with free T4 reflex  -     Wet Prep ([facility])  -     Neisseria gonorrhoeae PCR  -     Chlamydia trachomatis PCR  -     Urinalysis, Micro If (UA) ([place])    Screening for condition  -     Pap imaged thin layer screen with HPV - recommended age 30 - 65 years (select HPV order below)  -     HPV High Risk Types DNA Cervical    LBP without sciatica  - mentioned at end of visit. No red flags in ROS. Advise OV to address further.       Plans to RTC to review results together.  Flu shot declined.      There are no discontinued medications.    Options for treatment and follow-up care were reviewed with the patient. Satish Tang  engaged in the decision making process and verbalized understanding of the options discussed and agreed with the final plan.    Sallie Gibson MD

## 2020-01-28 NOTE — LETTER
February 4, 2020      Satish Hunter [place]clayton  [address] [facility] [address]        Dear Satish,    Thank you for getting your care at [facility]. Please see below for your test results.  - you have a small amount of blood in your urine (this has been present before as well), but all other testing is negative/normal. No infections in the urine or vagina.   - your pap smear is normal and should be repeated in 3-5 years.     Resulted Orders   CBC with Plt ([facility])   Result Value Ref Range    WBC 5.5 4.0 - 11.0 K/uL    RBC 4.86 3.80 - 5.20 M/uL    Hemoglobin 13.3 11.7 - 15.7 g/dL    Hematocrit 43.3 35.0 - 47.0 %    MCV 89.1 78.0 - 100.0 fL    MCH 27.4 26.5 - 35.0 pg    MCHC 30.7 (L) 32.0 - 36.0 g/dL    Platelets 263.0 150.0 - 450.0 K/uL   TSH with free T4 reflex   Result Value Ref Range    TSH 1.23 0.40 - 4.00 mU/L   HPV High Risk Types DNA Cervical   Result Value Ref Range    HPV Source SurePath     HPV 16 DNA Negative NEG^Negative    HPV 18 DNA Negative NEG^Negative    Other HR HPV Negative NEG^Negative    Final Diagnosis This patient's sample is negative for HPV DNA.       Comment:      This test was developed and its performance characteristics determined by the   [facility], [facility]. It   has not been cleared or approved by the FDA. The laboratory is regulated under   CLIA as qualified to perform high-complexity testing. This test is used for   clinical purposes. It should not be regarded as investigational or for   research.  (Note)  METHODOLOGY:  The Roche denise 4800 system uses automated extraction,   simultaneous amplification of HPV (L1 region) and beta-globin,    followed by  real time detection of fluorescent labeled HPV and beta   globin using specific oligonucleotide probes . The test specifically   identifies types HPV 16 DNA and HPV 18 DNA while concurrently   detecting the rest of the high risk types (31, 33, 35, 39, 45, 51,   52, 56, 58, 59, 66  or 68).  COMMENTS:  This test is not intended for use as a screening device   for women under age 30 with normal cervical cytology.  Results should   be correl  ated with cytologic and histologic findings. Close clinical   followup is recommended.      Specimen Description Cervical Cells       Comment:      C20 [address]   Wet Prep (LabDAQ)   Result Value Ref Range    Yeast Wet Prep None none    Motile Trichomonas Wet Prep Negative Negative    Clue Cells Wet Prep None NONE    WBC WET PREP None 2 - 5    Bacteria Wet Prep Moderate None    pH Wet Prep <4.5 3.8 - 4.5    Odor Wet Prep None NONE   Neisseria gonorrhoeae PCR   Result Value Ref Range    Specimen Descrip Vagina     N Gonorrhea PCR Negative NEG^Negative      Comment:      Negative for N. gonorrhoeae rRNA by transcription mediated amplification.  A negative result by transcription mediated amplification does not preclude   the presence of N. gonorrhoeae infection because results are dependent on   proper and adequate collection, absence of inhibitors, and sufficient rRNA to   be detected.     Chlamydia trachomatis PCR   Result Value Ref Range    Specimen Description Vagina     Chlamydia Trachomatis PCR Negative NEG^Negative      Comment:      Negative for C. trachomatis rRNA by transcription mediated amplification.  A negative result by transcription mediated amplification does not preclude   the presence of C. trachomatis infection because results are dependent on   proper and adequate collection, absence of inhibitors, and sufficient rRNA to   be detected.     Urinalysis, Micro If (UA) ([facility])   Result Value Ref Range    Specific Gravity Urine 1.010 1.005 - 1.030    pH Urine 6.0 4.5 - 8.0    Leukocyte Esterase UR Negative NEGATIVE    Nitrite Urine Negative NEGATIVE mg/dL    Protein UR Negative NEGATIVE mg/dL    Glucose Urine Negative NEGATIVE    Ketones Urine Negative NEGATIVE mg/dL    Urobilinogen mg/dL 0.2 E.U./dL 0.2 E.U./dL E.U./dL    Bilirubin UR Negative  NEGATIVE mg/dL    Blood UR 1+ (A) NEGATIVE mg/dL   Urine Microscopic (UA) ([facility])   Result Value Ref Range    WBC Urine None <5 /hpf    RBC Urine <2 <5 /hpf    Epithelial Cells UR None 0 - 2 /lpf    Mucous Urine None NONE lpf    Casts Urine None NONE /lpf    Crystal Urine None NONE /lpf    Bacteria Wet Prep None None   A pap thin layer screen   Result Value Ref Range    PAP NIL     Copath Report         Acc#: [id number]   Signed: 1/31/2020 08:16   MR#: [id number]    SPECIMEN/STAIN PROCESS:  Pap thin layer prep screening (Surepath)       Pap-Cyto x 1, HPV ordered x 1    SOURCE: Cervical, endocervical  ----------------------------------------------------------------   Pap thin layer prep screening (Surepath)  SPECIMEN ADEQUACY:  Satisfactory for evaluation.  Specimen was unable to be imaged on the   [facility] Slide .  -Transformation zone component absent.    CYTOLOGIC INTERPRETATION:    Negative for intraepithelial lesion or malignancy    Electronically signed by:  ESTHER Martinez (ASCP)    CLINICAL HISTORY:  LMP: 01/15/2020  Irregular Bleeding, A previous normal pap  Date of Last Pap: 02/06/2014,    Papanicolaou Test Limitations:  Cervical cytology is a screening test with   limited sensitivity; regular  screening is critical for cancer prevention; Pap tests are primarily   effective for the diagnosis/prevention of  squamous cell carcinoma, not adenocarcinomas or other cancers .    The technical component of this testing was completed at the [facility], with the professional component performed   at the [facility], [address] [place] [phone] ([phone])           As we discussed at our last visit, please follow up with a clinic appointment to follow-up on the urine test.     Sincerely,    Sallie Gibson MD

## 2020-01-29 LAB
C TRACH DNA SPEC QL NAA+PROBE: NEGATIVE
N GONORRHOEA DNA SPEC QL NAA+PROBE: NEGATIVE
SPECIMEN SOURCE: NORMAL
SPECIMEN SOURCE: NORMAL

## 2020-01-31 LAB
COPATH REPORT: NORMAL
PAP: NORMAL

## 2020-02-03 LAB
FINAL DIAGNOSIS: NORMAL
HPV HR 12 DNA CVX QL NAA+PROBE: NEGATIVE
HPV16 DNA SPEC QL NAA+PROBE: NEGATIVE
HPV18 DNA SPEC QL NAA+PROBE: NEGATIVE
SPECIMEN DESCRIPTION: NORMAL
SPECIMEN SOURCE CVX/VAG CYTO: NORMAL

## 2020-02-19 ENCOUNTER — OFFICE VISIT (OUTPATIENT)
Dept: FAMILY MEDICINE | Facility: CLINIC | Age: 50
End: 2020-02-19
Payer: COMMERCIAL

## 2020-02-19 VITALS
OXYGEN SATURATION: 99 % | TEMPERATURE: 98.4 F | DIASTOLIC BLOOD PRESSURE: 79 MMHG | SYSTOLIC BLOOD PRESSURE: 123 MMHG | WEIGHT: 166 LBS | BODY MASS INDEX: 28.34 KG/M2 | HEART RATE: 78 BPM | HEIGHT: 64 IN | RESPIRATION RATE: 18 BRPM

## 2020-02-19 DIAGNOSIS — K59.09 CHRONIC CONSTIPATION: ICD-10-CM

## 2020-02-19 DIAGNOSIS — Z00.00 PREVENTATIVE HEALTH CARE: ICD-10-CM

## 2020-02-19 DIAGNOSIS — K59.01 SLOW TRANSIT CONSTIPATION: ICD-10-CM

## 2020-02-19 DIAGNOSIS — N92.6 IRREGULAR PERIODS: Primary | ICD-10-CM

## 2020-02-19 RX ORDER — AMOXICILLIN 250 MG
1-2 CAPSULE ORAL 2 TIMES DAILY PRN
Qty: 90 TABLET | Refills: 3 | Status: SHIPPED | OUTPATIENT
Start: 2020-02-19 | End: 2021-05-04

## 2020-02-19 RX ORDER — LEVONORGESTREL AND ETHINYL ESTRADIOL 0.15-0.03
1 KIT ORAL DAILY
Qty: 31 TABLET | Refills: 11 | Status: SHIPPED | OUTPATIENT
Start: 2020-02-19 | End: 2021-05-04

## 2020-02-19 RX ORDER — MULTIPLE VITAMINS W/ MINERALS TAB 9MG-400MCG
1 TAB ORAL DAILY
Qty: 90 EACH | Refills: 4 | Status: SHIPPED | OUTPATIENT
Start: 2020-02-19 | End: 2021-05-04

## 2020-02-19 ASSESSMENT — MIFFLIN-ST. JEOR: SCORE: 1367.97

## 2020-02-19 NOTE — PROGRESS NOTES
"       HPI       Satish Tang is a 50 year old  who presents for   Chief Complaint   Patient presents with     Follow Up     Results, pt states no other concerns     Presented recently with concerns about vaginal spotting between periods. Here to day for results from ensuing work up.     All neg/normal. Reassured that this may be perimenopause, but she states that she is younger than her stated age, closer to 42 than 52. Would like to restart OCP tp try to regulate her periods. Has taken in past.     No h/o migraines with aura, HTN, clots, smoking.     +++++++    Problem, Medication and Allergy Lists were reviewed and updated if needed.    Patient is an established patient of this clinic.         Review of Systems:   Review of Systems  Unchanged.   No vaginal spotting for > 2 weeks now       Physical Exam:     Vitals:    02/19/20 0820   BP: 123/79   Pulse: 78   Resp: 18   Temp: 98.4  F (36.9  C)   TempSrc: Oral   SpO2: 99%   Weight: 75.3 kg (166 lb)   Height: 1.626 m (5' 4\")     Body mass index is 28.49 kg/m .  Vitals were reviewed      Physical Exam  Constitutional:       General: She is not in acute distress.     Appearance: She is not ill-appearing.   Neurological:      Mental Status: She is alert.   Psychiatric:         Mood and Affect: Mood normal.           Results:   Results from last visit:  Office Visit on 01/28/2020   Component Date Value Ref Range Status     WBC 01/28/2020 5.5  4.0 - 11.0 K/uL Final     RBC 01/28/2020 4.86  3.80 - 5.20 M/uL Final     Hemoglobin 01/28/2020 13.3  11.7 - 15.7 g/dL Final     Hematocrit 01/28/2020 43.3  35.0 - 47.0 % Final     MCV 01/28/2020 89.1  78.0 - 100.0 fL Final     MCH 01/28/2020 27.4  26.5 - 35.0 pg Final     MCHC 01/28/2020 30.7* 32.0 - 36.0 g/dL Final     Platelets 01/28/2020 263.0  150.0 - 450.0 K/uL Final     TSH 01/28/2020 1.23  0.40 - 4.00 mU/L Final     HPV Source 01/28/2020 [facility]   Final     HPV 16 DNA 01/28/2020 Negative  NEG^Negative Final     HPV 18 " DNA 01/28/2020 Negative  NEG^Negative Final     Other HR HPV 01/28/2020 Negative  NEG^Negative Final     Final Diagnosis 01/28/2020 This patient's sample is negative for HPV DNA.   Final    Comment: This test was developed and its performance characteristics determined by the   [facility], Molecular Diagnostics Laboratory. It   has not been cleared or approved by the FDA. The laboratory is regulated under   CLIA as qualified to perform high-complexity testing. This test is used for   clinical purposes. It should not be regarded as investigational or for   research.  (Note)  METHODOLOGY:  The Roche denise 4800 system uses automated extraction,   simultaneous amplification of HPV (L1 region) and beta-globin,    followed by  real time detection of fluorescent labeled HPV and beta   globin using specific oligonucleotide probes . The test specifically   identifies types HPV 16 DNA and HPV 18 DNA while concurrently   detecting the rest of the high risk types (31, 33, 35, 39, 45, 51,   52, 56, 58, 59, 66 or 68).  COMMENTS:  This test is not intended for use as a screening device   for women under age 30 with normal cervical cytology.  Results should   be correl                           ated with cytologic and histologic findings. Close clinical   followup is recommended.       Specimen Description 01/28/2020 Cervical Cells   Final    C20 [address]     Yeast Wet Prep 01/28/2020 None  none Final     Motile Trichomonas Wet Prep 01/28/2020 Negative  Negative Final     Clue Cells Wet Prep 01/28/2020 None  NONE Final     WBC WET PREP 01/28/2020 None  2 - 5 Final     Bacteria Wet Prep 01/28/2020 Moderate  None Final     pH Wet Prep 01/28/2020 <4.5  3.8 - 4.5 Final     Odor Wet Prep 01/28/2020 None  NONE Final     Specimen Descrip 01/28/2020 Vagina   Final     N Gonorrhea PCR 01/28/2020 Negative  NEG^Negative Final    Comment: Negative for N. gonorrhoeae rRNA by transcription mediated amplification.  A  negative result by transcription mediated amplification does not preclude   the presence of N. gonorrhoeae infection because results are dependent on   proper and adequate collection, absence of inhibitors, and sufficient rRNA to   be detected.       Specimen Description 01/28/2020 Vagina   Final     Chlamydia Trachomatis PCR 01/28/2020 Negative  NEG^Negative Final    Comment: Negative for C. trachomatis rRNA by transcription mediated amplification.  A negative result by transcription mediated amplification does not preclude   the presence of C. trachomatis infection because results are dependent on   proper and adequate collection, absence of inhibitors, and sufficient rRNA to   be detected.       Specific Gravity Urine 01/28/2020 1.010  1.005 - 1.030 Final     pH Urine 01/28/2020 6.0  4.5 - 8.0 Final     Leukocyte Esterase UR 01/28/2020 Negative  NEGATIVE Final     Nitrite Urine 01/28/2020 Negative  NEGATIVE mg/dL Final     Protein UR 01/28/2020 Negative  NEGATIVE mg/dL Final     Glucose Urine 01/28/2020 Negative  NEGATIVE Final     Ketones Urine 01/28/2020 Negative  NEGATIVE mg/dL Final     Urobilinogen mg/dL 01/28/2020 0.2 E.U./dL  0.2 E.U./dL E.U./dL Final     Bilirubin UR 01/28/2020 Negative  NEGATIVE mg/dL Final     Blood UR 01/28/2020 1+* NEGATIVE mg/dL Final     WBC Urine 01/28/2020 None  <5 /hpf Final     RBC Urine 01/28/2020 <2  <5 /hpf Final     Epithelial Cells UR 01/28/2020 None  0 - 2 /lpf Final     Mucous Urine 01/28/2020 None  NONE lpf Final     Casts Urine 01/28/2020 None  NONE /lpf Final     Crystal Urine 01/28/2020 None  NONE /lpf Final     Bacteria Wet Prep 01/28/2020 None  None Final     PAP 01/28/2020 NIL   Final     Copath Report 01/28/2020    Final                    Value:  Acc#: [id number]   Signed: 1/31/2020 08:16   MR#: [id number]    SPECIMEN/STAIN PROCESS:  Pap thin layer prep screening (Surepath)       Pap-Cyto x 1, HPV ordered x 1    SOURCE: Cervical,  endocervical  ----------------------------------------------------------------   Pap thin layer prep screening (Surepath)  SPECIMEN ADEQUACY:  Satisfactory for evaluation.  Specimen was unable to be imaged on the   [facility] Slide .  -Transformation zone component absent.    CYTOLOGIC INTERPRETATION:    Negative for intraepithelial lesion or malignancy    Electronically signed by:  ESTHER Martinez (ASCP)    CLINICAL HISTORY:  LMP: 01/15/2020  Irregular Bleeding, A previous normal pap  Date of Last Pap: 02/06/2014,    Papanicolaou Test Limitations:  Cervical cytology is a screening test with   limited sensitivity; regular  screening is critical for cancer prevention; Pap tests are primarily   effective for the diagnosis/prevention of  squamous cell carcinoma, not adenocarcinomas or other cancers                          .    The technical component of this testing was completed at the [facility], with the professional component performed   at the [facility], [address] [place] [phone] ([phone])             Assessment and Plan        Satish was seen today for follow up.    Diagnoses and all orders for this visit:    Irregular periods  -     levonorgestrel-ethinyl estradiol 0.15-30 MG-MCG PO tablet; Take 1 tablet by mouth daily    Chronic constipation  -     Refill senna-docusate (NINA-COLACE) 8.6-50 MG PO tablet; Take 1-2 tablets by mouth 2 times daily as needed for constipation    Preventative health care  -     multivitamin w/minerals PO tablet; Take 1 tablet by mouth daily        -     Wanted flu shot but we ran out, advised to either go to pharmacy to get or to schedule RN visit when supply replenished.    Overweight (BMI 28)         -     Invited to make return visit to address       There are no discontinued medications.    Options for treatment and follow-up care  were reviewed with the patient. Satish Tang  engaged in the decision making process and verbalized understanding of the options discussed and agreed with the final plan.    Sallie Gibson MD

## 2021-05-04 ENCOUNTER — OFFICE VISIT (OUTPATIENT)
Dept: FAMILY MEDICINE | Facility: CLINIC | Age: 51
End: 2021-05-04
Payer: COMMERCIAL

## 2021-05-04 VITALS
WEIGHT: 174.6 LBS | HEART RATE: 74 BPM | TEMPERATURE: 98 F | SYSTOLIC BLOOD PRESSURE: 128 MMHG | BODY MASS INDEX: 30.94 KG/M2 | HEIGHT: 63 IN | DIASTOLIC BLOOD PRESSURE: 83 MMHG | OXYGEN SATURATION: 100 %

## 2021-05-04 DIAGNOSIS — N94.6 DYSMENORRHEA: ICD-10-CM

## 2021-05-04 DIAGNOSIS — N92.6 IRREGULAR MENSTRUAL CYCLE: Primary | ICD-10-CM

## 2021-05-04 DIAGNOSIS — B37.31 YEAST INFECTION OF THE VAGINA: ICD-10-CM

## 2021-05-04 DIAGNOSIS — Z00.00 PREVENTATIVE HEALTH CARE: ICD-10-CM

## 2021-05-04 DIAGNOSIS — N89.8 VAGINAL DISCHARGE: ICD-10-CM

## 2021-05-04 DIAGNOSIS — K59.09 CHRONIC CONSTIPATION: ICD-10-CM

## 2021-05-04 DIAGNOSIS — E66.811 CLASS 1 OBESITY WITHOUT SERIOUS COMORBIDITY WITH BODY MASS INDEX (BMI) OF 30.0 TO 30.9 IN ADULT, UNSPECIFIED OBESITY TYPE: ICD-10-CM

## 2021-05-04 LAB
BACTERIA: NORMAL
CLUE CELLS: NORMAL
MOTILE TRICHOMONAS: NEGATIVE
ODOR: NORMAL
PH WET PREP: <4.5 (ref 3.8–4.5)
WBC WET PREP: NORMAL (ref 2–5)
YEAST: PRESENT

## 2021-05-04 PROCEDURE — 99214 OFFICE O/P EST MOD 30 MIN: CPT | Performed by: FAMILY MEDICINE

## 2021-05-04 PROCEDURE — 87491 CHLMYD TRACH DNA AMP PROBE: CPT | Performed by: FAMILY MEDICINE

## 2021-05-04 PROCEDURE — 87591 N.GONORRHOEAE DNA AMP PROB: CPT | Performed by: FAMILY MEDICINE

## 2021-05-04 PROCEDURE — 87210 SMEAR WET MOUNT SALINE/INK: CPT | Performed by: FAMILY MEDICINE

## 2021-05-04 RX ORDER — FLUCONAZOLE 150 MG/1
150 TABLET ORAL ONCE
Qty: 1 TABLET | Refills: 0 | Status: SHIPPED | OUTPATIENT
Start: 2021-05-04 | End: 2021-05-04

## 2021-05-04 RX ORDER — MULTIPLE VITAMINS W/ MINERALS TAB 9MG-400MCG
1 TAB ORAL DAILY
Qty: 90 TABLET | Refills: 3 | Status: SHIPPED | OUTPATIENT
Start: 2021-05-04 | End: 2022-01-31

## 2021-05-04 RX ORDER — LEVONORGESTREL AND ETHINYL ESTRADIOL 0.15-0.03
1 KIT ORAL DAILY
Qty: 31 TABLET | Refills: 11 | Status: SHIPPED | OUTPATIENT
Start: 2021-05-04 | End: 2022-01-31

## 2021-05-04 RX ORDER — IBUPROFEN 600 MG/1
600 TABLET, FILM COATED ORAL EVERY 6 HOURS PRN
Qty: 60 TABLET | Refills: 0 | Status: SHIPPED | OUTPATIENT
Start: 2021-05-04 | End: 2021-06-10

## 2021-05-04 RX ORDER — AMOXICILLIN 250 MG
1-2 CAPSULE ORAL 2 TIMES DAILY PRN
Qty: 90 TABLET | Refills: 3 | Status: SHIPPED | OUTPATIENT
Start: 2021-05-04 | End: 2022-01-31

## 2021-05-04 RX ORDER — CHOLECALCIFEROL (VITAMIN D3) 50 MCG
1 TABLET ORAL DAILY
Qty: 90 TABLET | Refills: 3 | Status: SHIPPED | OUTPATIENT
Start: 2021-05-04 | End: 2022-01-31

## 2021-05-04 ASSESSMENT — MIFFLIN-ST. JEOR: SCORE: 1386.11

## 2021-05-04 NOTE — LETTER
May 29, 2021      Satish Tang  [address] [facility] [address]        Dear Satish,    Thank you for getting your care at [facility]. Please see below for your test results.  Your results are normal except for a yeast infection in your vagina. I have sent a medication to your pharmacy which will treat this.     Resulted Orders   Wet Prep ([facility])   Result Value Ref Range    Yeast Wet Prep Present none    Motile Trichomonas Wet Prep Negative Negative    Clue Cells Wet Prep None NONE    WBC WET PREP 2-5 2 - 5    Bacteria Wet Prep Few None    pH Wet Prep <4.5 3.8 - 4.5    Odor Wet Prep None NONE   NEISSERIA GONORRHOEA PCR   Result Value Ref Range    Specimen Descrip Vagina     N Gonorrhea PCR Negative NEG^Negative      Comment:      Negative for N. gonorrhoeae rRNA by transcription mediated amplification.  A negative result by transcription mediated amplification does not preclude   the presence of N. gonorrhoeae infection because results are dependent on   proper and adequate collection, absence of inhibitors, and sufficient rRNA to   be detected.     CHLAMYDIA TRACHOMATIS PCR   Result Value Ref Range    Specimen Description Vagina     Chlamydia Trachomatis PCR Negative NEG^Negative      Comment:      Negative for C. trachomatis rRNA by transcription mediated amplification.  A negative result by transcription mediated amplification does not preclude   the presence of C. trachomatis infection because results are dependent on   proper and adequate collection, absence of inhibitors, and sufficient rRNA to   be detected.       If you have any concerns about these results please call and leave a message for me or send a MyChart message to the clinic.    Sincerely,    Sallie Gibson MD

## 2021-05-04 NOTE — PROGRESS NOTES
Assessment & Plan     Satish was seen today for pain and medication request.    Diagnoses and all orders for this visit:    Younger than stated age - thinks she is mid 40's, but I suspect now older.  Irregular menstrual cycle  -    Requests restart  levonorgestrel-ethinyl estradiol (NORDETTE) 0.15-30 MG-MCG tablet; Take 1 tablet by mouth daily with hopes of regulating cycle.  Does not believe that she could be experiencing perimenopause. Not interested in lab eval. Prior thyroid testing normal. No contraindications to hormonal OCP    Dysmenorrhea  -    refill ibuprofen (IBU) 600 MG tablet; Take 1 tablet (600 mg) by mouth every 6 hours as needed for pain    Vaginal discharge  -     Wet Prep ([facility])  - yeast vaginitis. Diflucan x 1  -     NEISSERIA GONORRHOEA PCR  -     CHLAMYDIA TRACHOMATIS PCR    Chronic constipation  -     Refill senna-docusate (NINA-COLACE) 8.6-50 MG tablet; Take 1-2 tablets by mouth 2 times daily as needed for constipation    Preventative health care        -     declined assist with scheduling COVID vaccine  -     multivitamin w/minerals (THERA-VIT-M) tablet; Take 1 tablet by mouth daily  -     vitamin D3 (CHOLECALCIFEROL) 50 mcg (2000 units) tablet; Take 1 tablet (50 mcg) by mouth daily    Class 1 obesity without serious comorbidity with body mass index (BMI) of 30.0 to 30.9 in adult, unspecified obesity type         -    invited to return for focused visit on weight management  [id number]}    MD RHONDA Bhatti [facility]  --------------------------------------------------------------    Subjective   Satish is a 51 year old who presents for the following health issues  accompanied by her :  Chief Complaint   Patient presents with     Pain     Period irregular, sometimes light, sometimes heavier. Painful      Medication Request      refill on multivitamins and senna and request for tylenol and vitamin D       HPI   Very pleasant woman, estimates her age to be  "approx 47. Has had irregular periods for years - dating back to at least 2017.  with last delivery in . Had Paragard briefly in  but felt that it caused abdominal pain and headaches so had it removed. Has used OCP's off and on in interval and requests restart today to \"make periods normal\". Explained natural process of perimenopause but she does not believe that she is old enough for that to be what is happening. Periods now are sometimes every 2 weeks, sometimes skips a month. Has heavy and light days - never bleeding enough to have to change pads more than 4 times a day however. Does have cramping in lower abdomen and back with period. Reports LMP within last 3 weeks.      Review of Systems   Constitutional: Negative for fatigue.        8# weight gain during COVID   Respiratory: Negative.    Cardiovascular: Negative.    Gastrointestinal: Positive for constipation.        Chronic    Genitourinary: Positive for vaginal discharge. Negative for dyspareunia, dysuria, frequency, genital sores, urgency and vaginal pain.        White vag discharge, no itch or odor. Would like testing        Objective    /83   Pulse 74   Temp 98  F (36.7  C) (Oral)   Ht 1.6 m (5' 3\")   Wt 79.2 kg (174 lb 9.6 oz)   LMP 2021 (Exact Date)   SpO2 100%   Breastfeeding No   BMI 30.93 kg/m       Physical Exam  Constitutional:       General: She is not in acute distress.     Appearance: She is obese. She is not ill-appearing.   Cardiovascular:      Rate and Rhythm: Normal rate and regular rhythm.      Heart sounds: Normal heart sounds.   Pulmonary:      Effort: Pulmonary effort is normal.      Breath sounds: Normal breath sounds.   Abdominal:      General: Bowel sounds are normal. There is no distension.      Palpations: Abdomen is soft. There is no mass.      Tenderness: There is no abdominal tenderness.   Genitourinary:     Comments: Gr 2 circ, accessible introitus but covered urethra. Scant white discharge, " not curdy, no odor. Muliparous cervix, healthy appearing. No CMT, nonpregnant size uterus.  Neurological:      Mental Status: She is alert.         Results for orders placed or performed in visit on 05/04/21 (from the past 24 hour(s))   Wet Prep ([facility])   Result Value Ref Range    Yeast Wet Prep Present none    Motile Trichomonas Wet Prep Negative Negative    Clue Cells Wet Prep None NONE    WBC WET PREP 2-5 2 - 5    Bacteria Wet Prep Few None    pH Wet Prep <4.5 3.8 - 4.5    Odor Wet Prep None NONE

## 2021-05-13 ASSESSMENT — ENCOUNTER SYMPTOMS
CARDIOVASCULAR NEGATIVE: 1
CONSTIPATION: 1
FATIGUE: 0
FREQUENCY: 0
ROS GI COMMENTS: CHRONIC
RESPIRATORY NEGATIVE: 1
DYSURIA: 0

## 2021-05-29 DIAGNOSIS — B37.31 YEAST INFECTION OF THE VAGINA: Primary | ICD-10-CM

## 2021-05-29 RX ORDER — FLUCONAZOLE 150 MG/1
150 TABLET ORAL ONCE
Qty: 1 TABLET | Refills: 0 | Status: SHIPPED | OUTPATIENT
Start: 2021-05-29 | End: 2021-05-29

## 2021-06-09 DIAGNOSIS — N94.6 DYSMENORRHEA: ICD-10-CM

## 2021-06-09 NOTE — TELEPHONE ENCOUNTER
Ibuprofen 600mg last filled 5/4/2021 for #60    Thank you,    Fabiola Fortune, PharmD  [facility]  [phone]

## 2021-06-10 RX ORDER — IBUPROFEN 600 MG/1
600 TABLET, FILM COATED ORAL EVERY 6 HOURS PRN
Qty: 60 TABLET | Refills: 0 | Status: SHIPPED | OUTPATIENT
Start: 2021-06-10 | End: 2021-10-26

## 2021-06-19 NOTE — PROGRESS NOTES
Assessment & Plan     Rhomboid muscle pain  Strain of cervical portion of both trapezius muscles  - acetaminophen (TYLENOL) 500 MG tablet; Take 1-2 tablets (500-1,000 mg) by mouth every 6 hours as needed for mild pain  - PHYSICAL THERAPY REFERRAL - EXTERNAL; Future    Acne, unspecified acne type  - tretinoin (RETIN-A) 0.1 % external cream; Apply topically At Bedtime    Preventative Care  - interested in COVID vaccine. Will ask [facility] RN each out about scheduling     Follow-up prn    MD [facility]  -------------------------------------------------------------------------------    Subjective   Satish is a 51 year old who presents for the following health issues  accompanied by her :  Chief Complaint   Patient presents with     Med for skin          Shoulder Pain     Patient reports upper shoulder tension radiating to mid back     HPI     Here with daughter for her [facility]. Requests to be seen as well for referral and med request.     1) has picture of retin A product on her phone and says she has been using it for acne. Would like it as a prescription.     2) has episodes of tight muscles and associated pain in her upper back, both sides, radiates down. Not sure what provokes - maybe housework? Relieved with heat, massage, gentle stretching. Rarely takes meds. Requests referral for PT/massage.       Review of Systems    neg      Objective    /76   Pulse 72   Temp 98.2  F (36.8  C) (Oral)   Resp 16   Wt 78.9 kg (174 lb)   SpO2 99%   BMI 30.82 kg/m      Physical Exam  Constitutional:       Appearance: Normal appearance.   Musculoskeletal:        Arms:       Comments: Identified as location of muscle tenderness that comes and goes. Not too bad now. No spasm or deformity appreciated. Overlying skin clear. FROM at neck, shoulders.   Skin:     Comments: Diffuse papules on B cheeks and nose. No scarring.    Neurological:      Mental Status: She is alert.

## 2021-06-23 ENCOUNTER — OFFICE VISIT (OUTPATIENT)
Dept: FAMILY MEDICINE | Facility: CLINIC | Age: 51
End: 2021-06-23
Payer: COMMERCIAL

## 2021-06-23 VITALS
RESPIRATION RATE: 16 BRPM | DIASTOLIC BLOOD PRESSURE: 76 MMHG | WEIGHT: 174 LBS | OXYGEN SATURATION: 99 % | TEMPERATURE: 98.2 F | SYSTOLIC BLOOD PRESSURE: 114 MMHG | BODY MASS INDEX: 30.82 KG/M2 | HEART RATE: 72 BPM

## 2021-06-23 DIAGNOSIS — M79.10 MUSCLE PAIN: Primary | ICD-10-CM

## 2021-06-23 DIAGNOSIS — L70.9 ACNE, UNSPECIFIED ACNE TYPE: ICD-10-CM

## 2021-06-23 DIAGNOSIS — S16.1XXA STRAIN OF CERVICAL PORTION OF BOTH TRAPEZIUS MUSCLES: ICD-10-CM

## 2021-06-23 DIAGNOSIS — M79.18 RHOMBOID MUSCLE PAIN: ICD-10-CM

## 2021-06-23 PROCEDURE — 99213 OFFICE O/P EST LOW 20 MIN: CPT | Performed by: FAMILY MEDICINE

## 2021-06-23 RX ORDER — TRETINOIN 1 MG/G
CREAM TOPICAL AT BEDTIME
Qty: 45 G | Refills: 3 | Status: SHIPPED | OUTPATIENT
Start: 2021-06-23

## 2021-06-23 RX ORDER — ACETAMINOPHEN 500 MG
500-1000 TABLET ORAL EVERY 6 HOURS PRN
Qty: 90 TABLET | Refills: 3 | Status: SHIPPED | OUTPATIENT
Start: 2021-06-23 | End: 2022-01-31

## 2021-06-23 NOTE — LETTER
[address]  [address]  [facility] [address]  [phone]          June 23, 2021    RE:  Satish Tang                                                                                                                                                       [address] [facility] [address]            To whom it may concern:    Satish Tang is under my professional care for      Strain of cervical portion of both trapezius muscles and of both rhomboid muscles.   She  may return to work with the following: The employee is UNABLE to return to work until July 20, 2021. It is my hope that she will not require work restrictions upon her return, if her therapy is successful.      Sincerely,      Sallie Gibson MD

## 2021-06-23 NOTE — NURSING NOTE
Due to patient being non-English speaking/uses sign language, an  was used for this visit. Only for face-to-face interpretation by an external agency, date and length of interpretation can be found on the scanned worksheet.     name: Tyson Hart  Agency: [employer]  Language: Burkinan   Telephone number: [phone]  Type of interpretation: Telephone, spoken    Patient declined

## 2021-08-02 ENCOUNTER — TRANSFERRED RECORDS (OUTPATIENT)
Dept: HEALTH INFORMATION MANAGEMENT | Facility: CLINIC | Age: 51
End: 2021-08-02

## 2021-08-16 ENCOUNTER — DOCUMENTATION ONLY (OUTPATIENT)
Dept: FAMILY MEDICINE | Facility: CLINIC | Age: 51
End: 2021-08-16

## 2021-08-16 NOTE — PROGRESS NOTES
Form has been completed by provider.     Form sent out via: Fax to [facility] at Fax Number: [phone]  Patient informed: na  Output date: August 16, 2021    Paradise Thomas      **Please close the encounter**

## 2021-10-15 ENCOUNTER — APPOINTMENT (OUTPATIENT)
Dept: GENERAL RADIOLOGY | Facility: CLINIC | Age: 51
End: 2021-10-15
Attending: EMERGENCY MEDICINE
Payer: COMMERCIAL

## 2021-10-15 ENCOUNTER — HOSPITAL ENCOUNTER (EMERGENCY)
Facility: CLINIC | Age: 51
Discharge: HOME | End: 2021-10-15
Attending: EMERGENCY MEDICINE | Admitting: EMERGENCY MEDICINE
Payer: COMMERCIAL

## 2021-10-15 VITALS
OXYGEN SATURATION: 100 % | TEMPERATURE: 98.2 F | DIASTOLIC BLOOD PRESSURE: 87 MMHG | RESPIRATION RATE: 16 BRPM | SYSTOLIC BLOOD PRESSURE: 123 MMHG | HEART RATE: 60 BPM

## 2021-10-15 DIAGNOSIS — S91.332A PUNCTURE WOUND OF PLANTAR ASPECT OF LEFT FOOT, INITIAL ENCOUNTER: ICD-10-CM

## 2021-10-15 PROCEDURE — 250N000013 HC RX MED GY IP 250 OP 250 PS 637: Performed by: EMERGENCY MEDICINE

## 2021-10-15 PROCEDURE — 250N000011 HC RX IP 250 OP 636: Performed by: EMERGENCY MEDICINE

## 2021-10-15 PROCEDURE — 90471 IMMUNIZATION ADMIN: CPT | Performed by: EMERGENCY MEDICINE

## 2021-10-15 PROCEDURE — 99283 EMERGENCY DEPT VISIT LOW MDM: CPT | Mod: 25 | Performed by: EMERGENCY MEDICINE

## 2021-10-15 PROCEDURE — 99284 EMERGENCY DEPT VISIT MOD MDM: CPT | Performed by: EMERGENCY MEDICINE

## 2021-10-15 PROCEDURE — 90715 TDAP VACCINE 7 YRS/> IM: CPT | Performed by: EMERGENCY MEDICINE

## 2021-10-15 PROCEDURE — 73630 X-RAY EXAM OF FOOT: CPT | Mod: LT

## 2021-10-15 RX ORDER — LEVOFLOXACIN 750 MG/1
750 TABLET, FILM COATED ORAL DAILY
Qty: 5 TABLET | Refills: 0 | Status: SHIPPED | OUTPATIENT
Start: 2021-10-15 | End: 2021-10-20

## 2021-10-15 RX ORDER — LEVOFLOXACIN 750 MG/1
750 TABLET, FILM COATED ORAL ONCE
Status: COMPLETED | OUTPATIENT
Start: 2021-10-15 | End: 2021-10-15

## 2021-10-15 RX ORDER — ACETAMINOPHEN 500 MG
1000 TABLET ORAL ONCE
Status: COMPLETED | OUTPATIENT
Start: 2021-10-15 | End: 2021-10-15

## 2021-10-15 RX ADMIN — ACETAMINOPHEN 1000 MG: 500 TABLET ORAL at 19:00

## 2021-10-15 RX ADMIN — CLOSTRIDIUM TETANI TOXOID ANTIGEN (FORMALDEHYDE INACTIVATED), CORYNEBACTERIUM DIPHTHERIAE TOXOID ANTIGEN (FORMALDEHYDE INACTIVATED), BORDETELLA PERTUSSIS TOXOID ANTIGEN (GLUTARALDEHYDE INACTIVATED), BORDETELLA PERTUSSIS FILAMENTOUS HEMAGGLUTININ ANTIGEN (FORMALDEHYDE INACTIVATED), BORDETELLA PERTUSSIS PERTACTIN ANTIGEN, AND BORDETELLA PERTUSSIS FIMBRIAE 2/3 ANTIGEN 0.5 ML: 5; 2; 2.5; 5; 3; 5 INJECTION, SUSPENSION INTRAMUSCULAR at 19:00

## 2021-10-15 RX ADMIN — LEVOFLOXACIN 750 MG: 750 TABLET, FILM COATED ORAL at 19:02

## 2021-10-16 NOTE — DISCHARGE INSTRUCTIONS
Please make an appointment to follow up with Your Primary Care Provider  if not improving.    Take the antibiotics as prescribed. Can take ibuprofen or tylenol for pain. Change the bandage daily and watch for any signs of infection including redness, drainage from the wound, fever. Keep the wound clean.    Return to the ED if you develop worsening pain, signs of infection, fever, numbness, tingling, weakness, or any new or worsening concerns.

## 2021-10-25 NOTE — ED PROVIDER NOTES
ED Provider Note  [facility]      History     Chief Complaint   Patient presents with     Puncture Wound     Onset today at 240, stepped on sharp object, poked through shoe into left foot.     GER Tang is a 51 year old female who is otherwise healthy who presents for evaluation of puncture wound to the plantar aspect of her left foot. Patient reports at 2:40 pm she was shopping when she went into the check out line and felt a sharp poke through her shoe in her left foot. She realized that a needle from a security tag had poked through into her foot. She states it did not stay in her foot and the needle did not break. Her last tdap was in 2013. She states that she washed the area out and the bleeding stopped. She reports pain in the area but is able to walk. She denies any numbness, tingling, weakness, fever, or other injuries. She has not taken anything for pain. She did not fall. She is not on any anticoagulants. She denies any history of diabetes.     [place] IPAD  used for the history and physical exam and explanation of result and plan.     Past Medical History  History reviewed. No pertinent past medical history.  History reviewed. No pertinent surgical history.  acetaminophen (TYLENOL) 500 MG tablet  ibuprofen (IBU) 600 MG tablet  levonorgestrel-ethinyl estradiol (NORDETTE) 0.15-30 MG-MCG tablet  multivitamin w/minerals (THERA-VIT-M) tablet  senna-docusate (NINA-COLACE) 8.6-50 MG tablet  tretinoin (RETIN-A) 0.1 % external cream  vitamin D3 (CHOLECALCIFEROL) 50 mcg (2000 units) tablet      No Known Allergies  Family History  No family history on file.  Social History   Social History     Tobacco Use     Smoking status: Never Smoker     Smokeless tobacco: Never Used   Substance Use Topics     Alcohol use: No     Drug use: No      Past medical history, past surgical history, medications, allergies, family history, and social history were reviewed with the patient.  No additional pertinent items.       Review of Systems  A complete review of systems was performed with pertinent positives and negatives noted in the HPI, and all other systems negative.    Physical Exam   BP: 124/78  Pulse: 60  Temp: 98.2  F (36.8  C)  Resp: 16  SpO2: 100 %  Physical Exam  Constitutional:       General: She is not in acute distress.  HENT:      Head: Normocephalic and atraumatic.      Mouth/Throat:      Mouth: Mucous membranes are moist.   Eyes:      Extraocular Movements: Extraocular movements intact.      Conjunctiva/sclera: Conjunctivae normal.      Pupils: Pupils are equal, round, and reactive to light.   Cardiovascular:      Rate and Rhythm: Normal rate and regular rhythm.      Pulses: Normal pulses.      Heart sounds: Normal heart sounds.   Pulmonary:      Effort: Pulmonary effort is normal. No respiratory distress.      Breath sounds: Normal breath sounds.   Abdominal:      General: Abdomen is flat. There is no distension.      Palpations: Abdomen is soft.      Tenderness: There is no abdominal tenderness.   Musculoskeletal:      Cervical back: Normal range of motion and neck supple.        Feet:       Comments: Puncture wound to plantar aspect of left foot near 5th metatarsal area. No current bleeding. No surrounding erythema. Mild tenderness in the wound area but no bony tenderness of the foot. Normal range of motion of all toes. Sensation intact to light touch. No obvious foreign body.    Skin:     General: Skin is warm and dry.      Capillary Refill: Capillary refill takes less than 2 seconds.      Comments: Puncture wound as above   Neurological:      General: No focal deficit present.      Mental Status: She is alert and oriented to person, place, and time.      Cranial Nerves: No cranial nerve deficit.      Sensory: No sensory deficit.      Motor: No weakness.   Psychiatric:         Mood and Affect: Mood normal.         ED Course      Procedures       The medical record was reviewed  and interpreted.  Current images reviewed and interpreted: as below.       Results for orders placed or performed during the hospital encounter of 10/15/21   Foot XR, G/E 3 views, left     Status: None    Narrative    EXAM: XR FOOT LEFT G/E 3 VIEWS  LOCATION: [facility]  DATE/TIME: 10/15/2021 6:13 PM    INDICATION: Puncture wound from sharp object to plantar aspect of left foot near 5th metatarsal area, evaluate for foreign body. Problem/pain.    COMPARISON: None.    FINDINGS: Plantar calcaneal spur.      Impression    IMPRESSION: No fracture or radiopaque foreign body identified.       Medications   Tdap (tetanus-diphtheria-acell pertussis) (ADACEL) injection 0.5 mL (0.5 mLs Intramuscular Given 10/15/21 1900)   acetaminophen (TYLENOL) tablet 1,000 mg (1,000 mg Oral Given 10/15/21 1900)   levofloxacin (LEVAQUIN) tablet 750 mg (750 mg Oral Given 10/15/21 1902)        Assessments & Plan (with Medical Decision Making)   Patient presents with a puncture wound from a security tag needle that went through the sole of her left shoe into the plantar aspect of the left foot. The wound was thoroughly irrigated and cleaned here. Xray of the left foot was obtained to evaluate for foreign body which was negative. Also no sign of bony injury. Patient is neurovascularly intact and able to ambulate. As this went through the sole of her shoe we did have concern for potential infection including pseudomonas. Tdap was given. Tylenol was given for pain. Discussed antibiotics with the ED pharmacist who recommended levaquin for empiric coverage. She was given her first dose here and given a 5 day course. She was given return precautions including signs of infection. The wound was dressed. She was advised to follow up with her primary doctor. She voiced understanding and was in agreement with this plan. She was discharged in stable condition.     I have reviewed the nursing notes. I have  reviewed the findings, diagnosis, plan and need for follow up with the patient.    Discharge Medication List as of 10/15/2021  7:46 PM      START taking these medications    Details   levofloxacin (LEVAQUIN) 750 MG tablet Take 1 tablet (750 mg) by mouth daily for 5 days, Disp-5 tablet, R-0, Local Print             Final diagnoses:   Puncture wound of plantar aspect of left foot, initial encounter       --  Jemma Barry MD  [facility] EMERGENCY DEPARTMENT  10/15/2021     Jemma Barry MD  10/24/21 8204

## 2021-10-26 DIAGNOSIS — N94.6 DYSMENORRHEA: ICD-10-CM

## 2021-10-26 RX ORDER — IBUPROFEN 600 MG/1
600 TABLET, FILM COATED ORAL EVERY 6 HOURS PRN
Qty: 60 TABLET | Refills: 0 | Status: SHIPPED | OUTPATIENT
Start: 2021-10-26 | End: 2022-01-31

## 2022-01-31 ENCOUNTER — OFFICE VISIT (OUTPATIENT)
Dept: FAMILY MEDICINE | Facility: CLINIC | Age: 52
End: 2022-01-31
Payer: COMMERCIAL

## 2022-01-31 ENCOUNTER — ANCILLARY PROCEDURE (OUTPATIENT)
Dept: GENERAL RADIOLOGY | Facility: CLINIC | Age: 52
End: 2022-01-31
Attending: FAMILY MEDICINE
Payer: COMMERCIAL

## 2022-01-31 VITALS
WEIGHT: 170 LBS | TEMPERATURE: 98.2 F | HEART RATE: 97 BPM | OXYGEN SATURATION: 99 % | RESPIRATION RATE: 16 BRPM | HEIGHT: 63 IN | BODY MASS INDEX: 30.12 KG/M2 | DIASTOLIC BLOOD PRESSURE: 74 MMHG | SYSTOLIC BLOOD PRESSURE: 111 MMHG

## 2022-01-31 DIAGNOSIS — K59.09 CHRONIC CONSTIPATION: ICD-10-CM

## 2022-01-31 DIAGNOSIS — M25.551 HIP PAIN, RIGHT: Primary | ICD-10-CM

## 2022-01-31 DIAGNOSIS — N92.6 IRREGULAR MENSTRUAL CYCLE: ICD-10-CM

## 2022-01-31 DIAGNOSIS — M25.551 HIP PAIN, RIGHT: ICD-10-CM

## 2022-01-31 DIAGNOSIS — Z00.00 PREVENTATIVE HEALTH CARE: ICD-10-CM

## 2022-01-31 LAB — HCG UR QL: NEGATIVE

## 2022-01-31 PROCEDURE — 73522 X-RAY EXAM HIPS BI 3-4 VIEWS: CPT | Mod: FY | Performed by: RADIOLOGY

## 2022-01-31 PROCEDURE — 81025 URINE PREGNANCY TEST: CPT | Performed by: FAMILY MEDICINE

## 2022-01-31 PROCEDURE — 99214 OFFICE O/P EST MOD 30 MIN: CPT | Performed by: FAMILY MEDICINE

## 2022-01-31 RX ORDER — MUSCLE RUB CREAM 100; 150 MG/G; MG/G
1 CREAM TOPICAL EVERY 6 HOURS PRN
Qty: 90 G | Refills: 4 | Status: SHIPPED | OUTPATIENT
Start: 2022-01-31

## 2022-01-31 RX ORDER — IBUPROFEN 600 MG/1
600 TABLET, FILM COATED ORAL EVERY 6 HOURS PRN
Qty: 60 TABLET | Refills: 0 | Status: SHIPPED | OUTPATIENT
Start: 2022-01-31

## 2022-01-31 RX ORDER — AMOXICILLIN 250 MG
1-2 CAPSULE ORAL 2 TIMES DAILY PRN
Qty: 90 TABLET | Refills: 3 | Status: SHIPPED | OUTPATIENT
Start: 2022-01-31

## 2022-01-31 RX ORDER — CAPSAICIN 0.025 %
1 CREAM (GRAM) TOPICAL 3 TIMES DAILY PRN
Qty: 120 G | Refills: 1 | Status: SHIPPED | OUTPATIENT
Start: 2022-01-31

## 2022-01-31 RX ORDER — MULTIPLE VITAMINS W/ MINERALS TAB 9MG-400MCG
1 TAB ORAL DAILY
Qty: 90 TABLET | Refills: 3 | Status: SHIPPED | OUTPATIENT
Start: 2022-01-31

## 2022-01-31 RX ORDER — CHOLECALCIFEROL (VITAMIN D3) 50 MCG
1 TABLET ORAL DAILY
Qty: 90 TABLET | Refills: 3 | Status: SHIPPED | OUTPATIENT
Start: 2022-01-31

## 2022-01-31 RX ORDER — LEVONORGESTREL AND ETHINYL ESTRADIOL 0.15-0.03
1 KIT ORAL DAILY
Qty: 84 TABLET | Refills: 4 | Status: SHIPPED | OUTPATIENT
Start: 2022-01-31 | End: 2023-01-31

## 2022-01-31 RX ORDER — ACETAMINOPHEN 500 MG
500-1000 TABLET ORAL EVERY 6 HOURS PRN
Qty: 90 TABLET | Refills: 3 | Status: SHIPPED | OUTPATIENT
Start: 2022-01-31

## 2022-01-31 ASSESSMENT — MIFFLIN-ST. JEOR: SCORE: 1360.11

## 2022-01-31 NOTE — PATIENT INSTRUCTIONS
Patient Education   Here is the plan from today's visit    1. Dysmenorrhea  - ibuprofen (IBU) 600 MG tablet; Take 1 tablet (600 mg) by mouth every 6 hours as needed for mild pain  Dispense: 60 tablet; Refill: 0    2. Irregular menstrual cycle  - levonorgestrel-ethinyl estradiol (NORDETTE) 0.15-30 MG-MCG tablet; Take 1 tablet by mouth daily  Dispense: 84 tablet; Refill: 4    3. Chronic constipation  - senna-docusate (NINA-COLACE) 8.6-50 MG tablet; Take 1-2 tablets by mouth 2 times daily as needed for constipation  Dispense: 90 tablet; Refill: 3    4. Preventative health care  - vitamin D3 (CHOLECALCIFEROL) 50 mcg (2000 units) tablet; Take 1 tablet (50 mcg) by mouth daily  Dispense: 90 tablet; Refill: 3  - multivitamin w/minerals (THERA-VIT-M) tablet; Take 1 tablet by mouth daily  Dispense: 90 tablet; Refill: 3    5. Muscle pain  - acetaminophen (TYLENOL) 500 MG tablet; Take 1-2 tablets (500-1,000 mg) by mouth every 6 hours as needed for mild pain  Dispense: 90 tablet; Refill: 3    6. Rhomboid muscle pain  - acetaminophen (TYLENOL) 500 MG tablet; Take 1-2 tablets (500-1,000 mg) by mouth every 6 hours as needed for mild pain  Dispense: 90 tablet; Refill: 3    7. Strain of cervical portion of both trapezius muscles  - acetaminophen (TYLENOL) 500 MG tablet; Take 1-2 tablets (500-1,000 mg) by mouth every 6 hours as needed for mild pain  Dispense: 90 tablet; Refill: 3    8. Hip pain, right  - XR Pelvis and Hip Bilateral 2 Views; Future  - muscle rub (ARTHRITIS HOT) 10-15 % CREA; Apply 1 g topically every 6 hours as needed for moderate pain  Dispense: 90 g; Refill: 4  - capsaicin (ZOSTRIX) 0.025 % external cream; Apply 1 g topically 3 times daily as needed (pain)  Dispense: 120 g; Refill: 1  - [facility] Medicine Clinic - [facility] Internal Referral; Future    Please call or return to clinic if your symptoms don't go away.    Follow up plan  No follow-ups on file.    Thank you for coming to [facility] today.  Lab  Testing:  **If you had lab testing today and your results are reassuring or normal they will be mailed to you or sent through [employer] within 7 days.   **If the lab tests need quick action we will call you with the results.  **If you are having labs done on a different day, please call [phone] to schedule at [facility] or [phone] for other [facility] Outpatient Lab locations. Labs do not offer walk-in appointments.  The phone number we will call with results is # [phone] (home) . If this is not the best number please call our clinic and change the number.  Medication Refills:  If you need any refills please call your pharmacy and they will contact us.   If you need to  your refill at a new pharmacy, please contact the new pharmacy directly. The new pharmacy will help you get your medications transferred faster.   Scheduling:  If you have any concerns about today's visit or wish to schedule another appointment please call our office during normal business hours [phone] (8-5:00 M-F)  If a referral was made to an [facility] specialty provider and you do not get a call from central scheduling, please refer to directions on your visit summary or call our office during normal business hours for assistance.   If a Mammogram was ordered for you at the [facility] call [phone] to schedule or change your appointment.  If you had an XRay/CT/Ultrasound/MRI ordered the number is [phone] to schedule or change your radiology appointment.   [facility] has limited ultrasound appointments available on Wednesdays, if you would like your ultrasound at [facility], please call [phone] to schedule.   Medical Concerns:  If you have urgent medical concerns please call [phone] at any time of the day.    Irish Wiley MD

## 2022-01-31 NOTE — PROGRESS NOTES
"  Assessment & Plan     Hip pain, right  acute, atraumatic  Based on location of pain and positive/painful FADIR, concern for pathology with the hip acetabulum such as femoracetabular impingement.   - XR Pelvis and Hip Bilateral 2 Views; Future  - muscle rub (ARTHRITIS HOT) 10-15 % CREA; Apply 1 g topically every 6 hours as needed for moderate pain  Dispense: 90 g; Refill: 4  - capsaicin (ZOSTRIX) 0.025 % external cream; Apply 1 g topically 3 times daily as needed (pain)  Dispense: 120 g; Refill: 1  - Sports Medicine Clinic - [facility] Internal Referral; Future - if worsens or doesn't improve      2. Irregular menstrual cycle - well controlled with OCPs, refilled  - levonorgestrel-ethinyl estradiol (NORDETTE) 0.15-30 MG-MCG tablet; Take 1 tablet by mouth daily  Dispense: 84 tablet; Refill: 4    3. Chronic constipation  - stable, refilled  - senna-docusate (NINA-COLACE) 8.6-50 MG tablet; Take 1-2 tablets by mouth 2 times daily as needed for constipation  Dispense: 90 tablet; Refill: 3    4. Preventative health care - stable, refilled  - vitamin D3 (CHOLECALCIFEROL) 50 mcg (2000 units) tablet; Take 1 tablet (50 mcg) by mouth daily  Dispense: 90 tablet; Refill: 3  - multivitamin w/minerals (THERA-VIT-M) tablet; Take 1 tablet by mouth daily  Dispense: 90 tablet; Refill: 3  - ibuprofen (IBU) 600 MG tablet; Take 1 tablet (600 mg) by mouth every 6 hours as needed for mild pain  Dispense: 60 tablet; Refill: 0  - acetaminophen (TYLENOL) 500 MG tablet; Take 1-2 tablets (500-1,000 mg) by mouth every 6 hours as needed for mild pain  Dispense: 90 tablet; Refill: 3      30 minutes spent on the date of the encounter doing chart review, history and exam, documentation and further activities per the note       BMI:   Estimated body mass index is 30.12 kg/m  as calculated from the following:    Height as of this encounter: 1.6 m (5' 2.99\").    Weight as of this encounter: 77.1 kg (170 lb).     No follow-ups on file.    Irish Pérez " "MD Inez  [facility] RIP Covarrubias is a 52 year old who presents for the following health issues     HPI       Right hip pain   Started yesterday evening  No trauma or identifiable trigger  Very active with many children  Too painful to drive  Painful to do stairs or walk  Only very small improvement with tylenol  No knee or back pain    Review of Systems         Objective    /74   Pulse 97   Temp 98.2  F (36.8  C) (Oral)   Resp 16   Ht 1.6 m (5' 2.99\")   Wt 77.1 kg (170 lb)   SpO2 99%   BMI 30.12 kg/m    Body mass index is 30.12 kg/m .  Physical Exam  Musculoskeletal:      Right hip: Tenderness present. No crepitus. Decreased range of motion. Normal strength.      Left hip: No tenderness. Normal range of motion. Normal strength.        Legs:       Comments: Lateral hip pain, pain with internal rotation>external rotation.       positive FADIR          "

## 2022-02-01 ENCOUNTER — OFFICE VISIT (OUTPATIENT)
Dept: FAMILY MEDICINE | Facility: CLINIC | Age: 52
End: 2022-02-01
Payer: COMMERCIAL

## 2022-02-01 VITALS
WEIGHT: 176.2 LBS | OXYGEN SATURATION: 99 % | HEIGHT: 63 IN | BODY MASS INDEX: 31.22 KG/M2 | HEART RATE: 89 BPM | TEMPERATURE: 97.8 F | SYSTOLIC BLOOD PRESSURE: 114 MMHG | DIASTOLIC BLOOD PRESSURE: 74 MMHG

## 2022-02-01 DIAGNOSIS — Z23 HIGH PRIORITY FOR 2019-NCOV VACCINE: Primary | ICD-10-CM

## 2022-02-01 DIAGNOSIS — G57.01 PIRIFORMIS SYNDROME, RIGHT: ICD-10-CM

## 2022-02-01 PROCEDURE — 91305 COVID-19,PF,PFIZER (12+ YRS): CPT | Performed by: FAMILY MEDICINE

## 2022-02-01 PROCEDURE — 99213 OFFICE O/P EST LOW 20 MIN: CPT | Mod: 25 | Performed by: FAMILY MEDICINE

## 2022-02-01 PROCEDURE — 0051A COVID-19,PF,PFIZER (12+ YRS): CPT | Performed by: FAMILY MEDICINE

## 2022-02-01 ASSESSMENT — MIFFLIN-ST. JEOR: SCORE: 1380.43

## 2022-02-01 NOTE — PROGRESS NOTES
"  Assessment & Plan     Piriformis syndrome, right  - I suspect this is likely based on today's exam. Offered PT referral but she prefers to wait until she sees Sports Med next week.   - Can alternate Tylenol with NSAID and use topicals for pain. Make sure to take NSAID with food.     High priority for 2019-nCoV vaccine  - COVID-19,PF,PFIZER (12+ Yrs HI LABEL)    MD RHONDA Bhatti [facility] SMILEYS  ==============================  Subjective   Satish is a 52 year old who presents for the following health issues  Chief Complaint   Patient presents with     Pain     x 2-3 days, r hip side, muscle pains and patient informs of difficulty walking, not able to bend much. OTC medications are not helping the pain and pt informs to have done x rays yesterday     Zimm/In     COVID-19 VACCINE     HPI   Seen yesterday for this complaint by Dr. Bearden who felt that she might have an acetabular impingement. She obtained a hip xray, added prescription topical analgesics and referred her to Sports Med. Also provided multiple refills for her. Satish made appt today mostly to discuss COVID vaccination but also to make sure I'm in agreement regarding this new pain.     Briefly, she had an abrupt on set of sharp pain in her R buttock, hip with extension in to R thigh. No injury. Normal hip xray reviewed with her. She's been taking Ibu 600 mg which is helping and is picking up topical meds now.     Not covid vaccinated. Reports that she did have mild COVID infection at beginning of pandemic. Wants to know if that means she doesn't need vaccine now. Has 10 kids with big age gaps - most are no longer at home. Youngest is school aged though and sounds like she is vaccinated.    Review of Systems   Reported as neg       Objective    /74   Pulse 89   Temp 97.8  F (36.6  C) (Oral)   Ht 1.588 m (5' 2.5\")   Wt 79.9 kg (176 lb 3.2 oz)   LMP 12/28/2021 (Exact Date)   SpO2 99%   Breastfeeding No   BMI 31.71 kg/m  "   Physical Exam  Vitals reviewed: Overweight.   Constitutional:       General: She is not in acute distress.     Appearance: She is not ill-appearing.   Musculoskeletal:        Legs:    Neurological:      Mental Status: She is alert.

## 2022-02-03 ENCOUNTER — TELEPHONE (OUTPATIENT)
Dept: FAMILY MEDICINE | Facility: CLINIC | Age: 52
End: 2022-02-03
Payer: COMMERCIAL

## 2022-02-03 NOTE — TELEPHONE ENCOUNTER
Team,   Please call patient with xray results:    Xray shows to problem with the bones of the hip. If your pain continues, follow up with Sports Medicine at [facility].     -Irish Wiley MD

## 2022-02-04 NOTE — PATIENT INSTRUCTIONS
Patient Education     Hip Rotation (Flexibility)    These instructions are for the right hip. Switch sides for your left hip.  1. Lie on your back on the floor, with your knees bent and feet flat on the floor. Don t press your neck or lower back to the floor.  2. Rest your right ankle on your left knee.  3. Place a towel around the back of your left thigh. Pull on the ends of the towel to pull your left knee toward your chest. Feel the stretch in your buttocks.  4. Hold for 30 to 60 seconds. Lower your leg back down.  5. Repeat 2 to 3 times, or as instructed.  6. Switch legs and repeat.   7. Do this 3 times a day, or as instructed.  [employer] last reviewed this educational content on 2/1/2020 2000-2021 [employer]. All rights reserved. This information is not intended as a substitute for professional medical care. Always follow your healthcare professional's instructions.

## 2022-05-03 ENCOUNTER — IMMUNIZATION (OUTPATIENT)
Dept: FAMILY MEDICINE | Facility: CLINIC | Age: 52
End: 2022-05-03
Attending: FAMILY MEDICINE
Payer: COMMERCIAL

## 2022-05-03 DIAGNOSIS — Z23 HIGH PRIORITY FOR 2019-NCOV VACCINE: Primary | ICD-10-CM

## 2022-05-03 PROCEDURE — 99207 PR NO CHARGE LOS: CPT

## 2022-05-03 PROCEDURE — 0052A COVID-19,PF,PFIZER (12+ YRS): CPT

## 2022-05-03 PROCEDURE — 91305 COVID-19,PF,PFIZER (12+ YRS): CPT

## 2023-04-12 ENCOUNTER — ANCILLARY ORDERS (OUTPATIENT)
Dept: CT IMAGING | Facility: CLINIC | Age: 53
End: 2023-04-12

## 2023-04-12 ENCOUNTER — ANCILLARY PROCEDURE (OUTPATIENT)
Dept: CT IMAGING | Facility: CLINIC | Age: 53
End: 2023-04-12
Attending: INTERNAL MEDICINE
Payer: COMMERCIAL

## 2023-04-12 DIAGNOSIS — R42 ACUTE SEVERE VERTIGO: ICD-10-CM

## 2023-04-12 DIAGNOSIS — R42 DIZZINESS: ICD-10-CM

## 2023-04-12 PROCEDURE — 70450 CT HEAD/BRAIN W/O DYE: CPT | Performed by: RADIOLOGY
